# Patient Record
Sex: MALE | Race: WHITE | NOT HISPANIC OR LATINO | URBAN - METROPOLITAN AREA
[De-identification: names, ages, dates, MRNs, and addresses within clinical notes are randomized per-mention and may not be internally consistent; named-entity substitution may affect disease eponyms.]

---

## 2017-01-31 PROBLEM — Z00.00 ENCOUNTER FOR PREVENTIVE HEALTH EXAMINATION: Status: ACTIVE | Noted: 2017-01-31

## 2017-02-01 ENCOUNTER — OUTPATIENT (OUTPATIENT)
Dept: OUTPATIENT SERVICES | Facility: HOSPITAL | Age: 57
LOS: 1 days | End: 2017-02-01
Payer: COMMERCIAL

## 2017-02-01 ENCOUNTER — APPOINTMENT (OUTPATIENT)
Dept: MRI IMAGING | Facility: CLINIC | Age: 57
End: 2017-02-01

## 2017-02-01 DIAGNOSIS — Z00.8 ENCOUNTER FOR OTHER GENERAL EXAMINATION: ICD-10-CM

## 2017-02-01 PROCEDURE — 73721 MRI JNT OF LWR EXTRE W/O DYE: CPT

## 2017-05-03 ENCOUNTER — OUTPATIENT (OUTPATIENT)
Dept: OUTPATIENT SERVICES | Facility: HOSPITAL | Age: 57
LOS: 1 days | End: 2017-05-03
Payer: COMMERCIAL

## 2017-05-03 ENCOUNTER — APPOINTMENT (OUTPATIENT)
Dept: MRI IMAGING | Facility: CLINIC | Age: 57
End: 2017-05-03

## 2017-05-03 DIAGNOSIS — Z00.8 ENCOUNTER FOR OTHER GENERAL EXAMINATION: ICD-10-CM

## 2017-05-03 PROCEDURE — 72148 MRI LUMBAR SPINE W/O DYE: CPT

## 2017-05-12 ENCOUNTER — APPOINTMENT (OUTPATIENT)
Dept: ORTHOPEDIC SURGERY | Facility: CLINIC | Age: 57
End: 2017-05-12

## 2017-05-12 VITALS — WEIGHT: 180 LBS | HEIGHT: 75 IN | BODY MASS INDEX: 22.38 KG/M2

## 2017-05-12 VITALS — BODY MASS INDEX: 22.38 KG/M2 | WEIGHT: 180 LBS | HEIGHT: 75 IN

## 2017-05-12 VITALS
BODY MASS INDEX: 22.38 KG/M2 | WEIGHT: 180 LBS | DIASTOLIC BLOOD PRESSURE: 57 MMHG | SYSTOLIC BLOOD PRESSURE: 157 MMHG | HEIGHT: 75 IN | HEART RATE: 81 BPM

## 2017-05-12 RX ORDER — MULTIVITAMIN
TABLET ORAL
Refills: 0 | Status: ACTIVE | COMMUNITY

## 2017-06-15 ENCOUNTER — OUTPATIENT (OUTPATIENT)
Dept: OUTPATIENT SERVICES | Facility: HOSPITAL | Age: 57
LOS: 1 days | End: 2017-06-15
Payer: COMMERCIAL

## 2017-06-15 ENCOUNTER — APPOINTMENT (OUTPATIENT)
Dept: MRI IMAGING | Facility: CLINIC | Age: 57
End: 2017-06-15

## 2017-06-15 DIAGNOSIS — Z00.8 ENCOUNTER FOR OTHER GENERAL EXAMINATION: ICD-10-CM

## 2017-06-15 PROCEDURE — 72148 MRI LUMBAR SPINE W/O DYE: CPT

## 2017-06-26 ENCOUNTER — APPOINTMENT (OUTPATIENT)
Dept: ORTHOPEDIC SURGERY | Facility: CLINIC | Age: 57
End: 2017-06-26

## 2017-06-26 DIAGNOSIS — M54.5 LOW BACK PAIN: ICD-10-CM

## 2017-07-10 ENCOUNTER — APPOINTMENT (OUTPATIENT)
Dept: INTERVENTIONAL RADIOLOGY/VASCULAR | Facility: CLINIC | Age: 57
End: 2017-07-10

## 2017-07-10 VITALS
SYSTOLIC BLOOD PRESSURE: 122 MMHG | BODY MASS INDEX: 22.38 KG/M2 | TEMPERATURE: 98.2 F | HEIGHT: 75 IN | OXYGEN SATURATION: 100 % | HEART RATE: 70 BPM | WEIGHT: 180 LBS | DIASTOLIC BLOOD PRESSURE: 80 MMHG

## 2017-07-10 DIAGNOSIS — Z78.9 OTHER SPECIFIED HEALTH STATUS: ICD-10-CM

## 2017-07-10 DIAGNOSIS — R93.7 ABNORMAL FINDINGS ON DIAGNOSTIC IMAGING OF OTHER PARTS OF MUSCULOSKELETAL SYSTEM: ICD-10-CM

## 2017-07-10 DIAGNOSIS — S32.130D: ICD-10-CM

## 2017-07-10 DIAGNOSIS — M54.42 LUMBAGO WITH SCIATICA, LEFT SIDE: ICD-10-CM

## 2017-07-10 DIAGNOSIS — H35.351 CYSTOID MACULAR DEGENERATION, RIGHT EYE: ICD-10-CM

## 2017-07-10 RX ORDER — IBUPROFEN 200 MG/1
TABLET, COATED ORAL
Refills: 0 | Status: ACTIVE | COMMUNITY

## 2017-07-31 ENCOUNTER — OUTPATIENT (OUTPATIENT)
Dept: OUTPATIENT SERVICES | Facility: HOSPITAL | Age: 57
LOS: 1 days | End: 2017-07-31
Payer: COMMERCIAL

## 2017-07-31 ENCOUNTER — APPOINTMENT (OUTPATIENT)
Dept: MRI IMAGING | Facility: CLINIC | Age: 57
End: 2017-07-31
Payer: COMMERCIAL

## 2017-07-31 DIAGNOSIS — M25.511 PAIN IN RIGHT SHOULDER: ICD-10-CM

## 2017-07-31 DIAGNOSIS — Z00.8 ENCOUNTER FOR OTHER GENERAL EXAMINATION: ICD-10-CM

## 2017-07-31 DIAGNOSIS — G89.29 OTHER CHRONIC PAIN: ICD-10-CM

## 2017-07-31 PROCEDURE — 73221 MRI JOINT UPR EXTREM W/O DYE: CPT

## 2017-07-31 PROCEDURE — 73221 MRI JOINT UPR EXTREM W/O DYE: CPT | Mod: 26,RT

## 2017-08-24 ENCOUNTER — APPOINTMENT (OUTPATIENT)
Dept: ORTHOPEDIC SURGERY | Facility: CLINIC | Age: 57
End: 2017-08-24
Payer: COMMERCIAL

## 2017-08-24 VITALS — BODY MASS INDEX: 22.38 KG/M2 | WEIGHT: 180 LBS | HEIGHT: 75 IN

## 2017-08-24 PROCEDURE — 99214 OFFICE O/P EST MOD 30 MIN: CPT

## 2017-08-24 PROCEDURE — 73030 X-RAY EXAM OF SHOULDER: CPT | Mod: RT

## 2017-11-18 ENCOUNTER — OUTPATIENT (OUTPATIENT)
Dept: OUTPATIENT SERVICES | Facility: HOSPITAL | Age: 57
LOS: 1 days | End: 2017-11-18
Payer: COMMERCIAL

## 2017-11-18 ENCOUNTER — APPOINTMENT (OUTPATIENT)
Dept: MRI IMAGING | Facility: CLINIC | Age: 57
End: 2017-11-18
Payer: COMMERCIAL

## 2017-11-18 DIAGNOSIS — Z00.8 ENCOUNTER FOR OTHER GENERAL EXAMINATION: ICD-10-CM

## 2017-11-18 PROCEDURE — 72148 MRI LUMBAR SPINE W/O DYE: CPT | Mod: 26

## 2017-11-18 PROCEDURE — 72148 MRI LUMBAR SPINE W/O DYE: CPT

## 2018-01-24 ENCOUNTER — APPOINTMENT (OUTPATIENT)
Dept: ORTHOPEDIC SURGERY | Facility: CLINIC | Age: 58
End: 2018-01-24
Payer: COMMERCIAL

## 2018-01-24 VITALS — WEIGHT: 180 LBS | HEIGHT: 75 IN | BODY MASS INDEX: 22.38 KG/M2

## 2018-01-24 PROCEDURE — 99215 OFFICE O/P EST HI 40 MIN: CPT | Mod: 25

## 2018-01-24 PROCEDURE — 20610 DRAIN/INJ JOINT/BURSA W/O US: CPT | Mod: RT

## 2018-07-27 PROBLEM — Z78.9 ALCOHOL USE: Status: ACTIVE | Noted: 2017-05-12

## 2018-09-20 ENCOUNTER — APPOINTMENT (OUTPATIENT)
Dept: ORTHOPEDIC SURGERY | Facility: CLINIC | Age: 58
End: 2018-09-20
Payer: COMMERCIAL

## 2018-09-20 VITALS — BODY MASS INDEX: 22.38 KG/M2 | WEIGHT: 180 LBS | HEIGHT: 75 IN

## 2018-09-20 DIAGNOSIS — G89.29 SACROCOCCYGEAL DISORDERS, NOT ELSEWHERE CLASSIFIED: ICD-10-CM

## 2018-09-20 DIAGNOSIS — M53.3 SACROCOCCYGEAL DISORDERS, NOT ELSEWHERE CLASSIFIED: ICD-10-CM

## 2018-09-20 PROCEDURE — 99214 OFFICE O/P EST MOD 30 MIN: CPT

## 2018-09-20 PROCEDURE — 73502 X-RAY EXAM HIP UNI 2-3 VIEWS: CPT | Mod: LT

## 2019-11-22 ENCOUNTER — APPOINTMENT (OUTPATIENT)
Dept: ORTHOPEDIC SURGERY | Facility: CLINIC | Age: 59
End: 2019-11-22
Payer: COMMERCIAL

## 2019-11-22 VITALS — WEIGHT: 180 LBS | BODY MASS INDEX: 22.38 KG/M2 | HEIGHT: 75 IN

## 2019-11-22 PROCEDURE — 99214 OFFICE O/P EST MOD 30 MIN: CPT

## 2019-11-27 ENCOUNTER — APPOINTMENT (OUTPATIENT)
Dept: MRI IMAGING | Facility: CLINIC | Age: 59
End: 2019-11-27
Payer: COMMERCIAL

## 2019-11-27 ENCOUNTER — OUTPATIENT (OUTPATIENT)
Dept: OUTPATIENT SERVICES | Facility: HOSPITAL | Age: 59
LOS: 1 days | End: 2019-11-27
Payer: COMMERCIAL

## 2019-11-27 DIAGNOSIS — M25.562 PAIN IN LEFT KNEE: ICD-10-CM

## 2019-11-27 PROCEDURE — 73721 MRI JNT OF LWR EXTRE W/O DYE: CPT | Mod: 26,LT

## 2019-11-27 PROCEDURE — 73721 MRI JNT OF LWR EXTRE W/O DYE: CPT

## 2019-12-16 ENCOUNTER — APPOINTMENT (OUTPATIENT)
Dept: ORTHOPEDIC SURGERY | Facility: CLINIC | Age: 59
End: 2019-12-16
Payer: COMMERCIAL

## 2019-12-16 PROCEDURE — 99214 OFFICE O/P EST MOD 30 MIN: CPT

## 2021-07-01 ENCOUNTER — APPOINTMENT (OUTPATIENT)
Dept: ORTHOPEDIC SURGERY | Facility: CLINIC | Age: 61
End: 2021-07-01
Payer: COMMERCIAL

## 2021-07-01 VITALS — BODY MASS INDEX: 22.38 KG/M2 | WEIGHT: 180 LBS | HEIGHT: 75 IN

## 2021-07-01 DIAGNOSIS — M25.562 PAIN IN LEFT KNEE: ICD-10-CM

## 2021-07-01 DIAGNOSIS — Z87.81 PERSONAL HISTORY OF (HEALED) TRAUMATIC FRACTURE: ICD-10-CM

## 2021-07-01 DIAGNOSIS — M70.62 TROCHANTERIC BURSITIS, LEFT HIP: ICD-10-CM

## 2021-07-01 DIAGNOSIS — M75.81 OTHER SHOULDER LESIONS, RIGHT SHOULDER: ICD-10-CM

## 2021-07-01 PROCEDURE — 73030 X-RAY EXAM OF SHOULDER: CPT | Mod: RT

## 2021-07-01 PROCEDURE — 73562 X-RAY EXAM OF KNEE 3: CPT | Mod: LT

## 2021-07-01 PROCEDURE — 99214 OFFICE O/P EST MOD 30 MIN: CPT

## 2021-07-06 PROBLEM — M75.81 ROTATOR CUFF TENDINITIS, RIGHT: Status: ACTIVE | Noted: 2017-08-24

## 2021-07-06 PROBLEM — Z87.81 S/P LEFT HIP FRACTURE: Status: ACTIVE | Noted: 2018-09-20

## 2021-07-06 PROBLEM — M25.562 ACUTE PAIN OF LEFT KNEE: Status: ACTIVE | Noted: 2019-11-22

## 2021-07-06 PROBLEM — M70.62 GREATER TROCHANTERIC BURSITIS OF LEFT HIP: Status: ACTIVE | Noted: 2018-09-20

## 2021-07-06 NOTE — DISCUSSION/SUMMARY
[de-identified] : Patient has moderate arthritis of the knee mild degenerative meniscal tearing will try and continue to consider treatment NSAIDs and physical therapy. Patient also understands the treatment options including arthroscopy. He tried NSAIDs physical therapy and follow up in 3 months patient understands and agrees with the plan\par \par He also has right shoulder rotator cuff tendinitis we will try conservative treatment of therapy strengthening

## 2021-07-06 NOTE — HISTORY OF PRESENT ILLNESS
[de-identified] : Mr. STEFFEN DENSON  is a 61 year old male who presents with  2 months of left knee pan and right shoulder pain.  HIs left knee hurts anteriorly under the knee cap since increasing his running mileage.  He feels his shoulder is related to his running as well. \par

## 2021-07-06 NOTE — PHYSICAL EXAM
[Antalgic] : antalgic [Normal RUE] : Right Upper Extremity: No scars, rashes, lesions, ulcers, skin intact [Normal Finger/nose] : finger to nose coordination [Normal Touch] : sensation intact for touch [Poor Appearance] : well-appearing [Acute Distress] : not in acute distress [Obese] : not obese [Normal] : No respiratory distress and lungs were clear to auscultation bilaterally [de-identified] : Patient appears stated age in no acute respiratory distress. Patient is alert oriented x3. Patient has normal mood and affect. \par Right knee exam a\par Range her for of motion of the knee is 0-120°. \par Skin is normal.  No rash.\par There is no effusion. No medial or lateral joint line tenderness. No swelling, no pitting edema.\par Overall alignment of the knee is then slight varus. Good anterior posterior stability. Firm endpoints on anterior and posterior drawer. \par Medial lateral stability is intact. Firm endpoint on medial and lateral stress testing .\par Theodora test is negative.\par Quadriceps strength 5/ 5. There is no loss of muscle volume in the thigh. \par Good anterior posterior and mediolateral stability.\par Sensation in the extremities intact. \par Discrimination is intact. Good DP and PT pulses.\par 		\par \par Bilateral hip exam\par On inspection of the hip shows skin is normal. No evidence of rash. \par No loss of muscle.  Abductor strength is 5 out of 5. Hip flexor strength is 5.\par Range of motion of the hip at 90° flexion internal rotation is 15° external rotation is 30° pain-free. \par Hip has good stability in anterior and posterior direction. \par On lateral decubitus  examination there is no tenderness in the greater trochanter. \par Lower Extremity Examination \par Bilateral lower extremity skin is normal. There is no rash. There is no edema and lymphadenopathy.  DP and PT pulses intact. Sensation is intact.\par Left knee exam\par Skin is normal. Range of motion 0-120°. Pain level at the deep end of flexion. Theodora test is positive. There is mild effusion. Good anterior posterior and mediolateral stability. Firm endpoints on anterior and posterior drawer test.\par Quadriceps strength is 5 x 5. There is no atrophy of the muscles. The knee alignment is overall in 5° of varus.\par  [de-identified] : X-rays of the left knee 3 views from the outside facility shows a well-preserved joint space\par \par X-rays of the right shoulder shows well-preserved joint space 3 views

## 2021-09-29 ENCOUNTER — APPOINTMENT (OUTPATIENT)
Dept: OPHTHALMOLOGY | Facility: CLINIC | Age: 61
End: 2021-09-29
Payer: COMMERCIAL

## 2021-09-29 ENCOUNTER — NON-APPOINTMENT (OUTPATIENT)
Age: 61
End: 2021-09-29

## 2021-09-29 PROCEDURE — 92134 CPTRZ OPH DX IMG PST SGM RTA: CPT

## 2021-09-29 PROCEDURE — 99214 OFFICE O/P EST MOD 30 MIN: CPT

## 2021-11-11 ENCOUNTER — APPOINTMENT (OUTPATIENT)
Dept: UROLOGY | Facility: CLINIC | Age: 61
End: 2021-11-11
Payer: COMMERCIAL

## 2021-11-11 VITALS
BODY MASS INDEX: 21.76 KG/M2 | OXYGEN SATURATION: 99 % | HEART RATE: 65 BPM | WEIGHT: 175 LBS | RESPIRATION RATE: 14 BRPM | SYSTOLIC BLOOD PRESSURE: 129 MMHG | HEIGHT: 75 IN | DIASTOLIC BLOOD PRESSURE: 84 MMHG | TEMPERATURE: 98 F

## 2021-11-11 DIAGNOSIS — R97.20 ELEVATED PROSTATE, SPECIFIC ANTIGEN [PSA]: ICD-10-CM

## 2021-11-11 PROCEDURE — 99203 OFFICE O/P NEW LOW 30 MIN: CPT

## 2021-11-11 NOTE — ASSESSMENT
[FreeTextEntry1] : Rising PSA level was noted and discussed with the patient.  He has family history of enlarged prostate but no prostate cancer.  His total PSA level remains completely within the normal range however.  His examination is unremarkable.  Recommend continuing to monitor PSA level on annual basis at this moment.  He is aware that prostate cancer can exist at any PSA level.  He will continue to monitor his levels and return to us in the future if needed.\par \par Anderson Salgado MD, FACS\par [facility]\par  of Urology\par \par [address], [address]\[place] [address]\par \par [address], [address]\[place] [address]\par \par Tel: [phone]\par Fax: [phone]

## 2021-11-11 NOTE — HISTORY OF PRESENT ILLNESS
[FreeTextEntry1] : He is a 61-year-old man who is seen today for initial visit.  He generally does not have significant urinary symptoms.  He does not complain of erectile dysfunction.  He was told to discuss his PSA level.  There is no known family history of prostate cancer.  He has family history of enlarged prostate.  Laboratory values showed PSA of 2.27 in September 2021, 1.8 in March 2021 and 1.2 in February 2020.  Creatinine was 0.9.  Residual urine today was less than 100 cc.

## 2021-11-11 NOTE — LETTER BODY
[Dear  ___] : Dear  [unfilled], [Consult Letter:] : I had the pleasure of evaluating your patient, [unfilled]. [Consult Closing:] : Thank you very much for allowing me to participate in the care of this patient.  If you have any questions, please do not hesitate to contact me. [FreeTextEntry1] : [facility]\par [address] \[place], [place], [address]  \par [phone]

## 2021-11-11 NOTE — PHYSICAL EXAM
[General Appearance - Well Developed] : well developed [General Appearance - Well Nourished] : well nourished [Normal Appearance] : normal appearance [Well Groomed] : well groomed [General Appearance - In No Acute Distress] : no acute distress [Edema] : no peripheral edema [Respiration, Rhythm And Depth] : normal respiratory rhythm and effort [Exaggerated Use Of Accessory Muscles For Inspiration] : no accessory muscle use [Abdomen Soft] : soft [Abdomen Tenderness] : non-tender [Costovertebral Angle Tenderness] : no ~M costovertebral angle tenderness [Urethral Meatus] : meatus normal [Penis Abnormality] : normal circumcised penis [Urinary Bladder Findings] : the bladder was normal on palpation [Scrotum] : the scrotum was normal [Testes Tenderness] : no tenderness of the testes [Testes Mass (___cm)] : there were no testicular masses [Prostate Tenderness] : the prostate was not tender [No Prostate Nodules] : no prostate nodules [FreeTextEntry1] : Prostate mildly enlarged. [Normal Station and Gait] : the gait and station were normal for the patient's age [] : no rash [No Focal Deficits] : no focal deficits [Oriented To Time, Place, And Person] : oriented to person, place, and time [Affect] : the affect was normal [Mood] : the mood was normal [Not Anxious] : not anxious [Inguinal Lymph Nodes Enlarged Bilaterally] : inguinal

## 2022-03-30 ENCOUNTER — NON-APPOINTMENT (OUTPATIENT)
Age: 62
End: 2022-03-30

## 2022-03-30 ENCOUNTER — APPOINTMENT (OUTPATIENT)
Dept: OPHTHALMOLOGY | Facility: CLINIC | Age: 62
End: 2022-03-30
Payer: COMMERCIAL

## 2022-03-30 PROCEDURE — 92014 COMPRE OPH EXAM EST PT 1/>: CPT

## 2022-03-30 PROCEDURE — 92134 CPTRZ OPH DX IMG PST SGM RTA: CPT

## 2022-05-21 ENCOUNTER — APPOINTMENT (OUTPATIENT)
Dept: MRI IMAGING | Facility: CLINIC | Age: 62
End: 2022-05-21
Payer: COMMERCIAL

## 2022-05-21 ENCOUNTER — OUTPATIENT (OUTPATIENT)
Dept: OUTPATIENT SERVICES | Facility: HOSPITAL | Age: 62
LOS: 1 days | End: 2022-05-21
Payer: COMMERCIAL

## 2022-05-21 DIAGNOSIS — Z00.8 ENCOUNTER FOR OTHER GENERAL EXAMINATION: ICD-10-CM

## 2022-05-21 PROCEDURE — 73721 MRI JNT OF LWR EXTRE W/O DYE: CPT

## 2022-05-21 PROCEDURE — 73721 MRI JNT OF LWR EXTRE W/O DYE: CPT | Mod: 26,LT

## 2022-05-25 ENCOUNTER — APPOINTMENT (OUTPATIENT)
Dept: ORTHOPEDIC SURGERY | Facility: CLINIC | Age: 62
End: 2022-05-25
Payer: COMMERCIAL

## 2022-05-25 DIAGNOSIS — M23.92 UNSPECIFIED INTERNAL DERANGEMENT OF LEFT KNEE: ICD-10-CM

## 2022-05-25 PROCEDURE — 99204 OFFICE O/P NEW MOD 45 MIN: CPT

## 2022-05-25 PROCEDURE — 99214 OFFICE O/P EST MOD 30 MIN: CPT

## 2022-05-25 PROCEDURE — 73564 X-RAY EXAM KNEE 4 OR MORE: CPT | Mod: LT

## 2022-05-25 NOTE — PHYSICAL EXAM
[de-identified] : Constitutional\par o Appearance : well-nourished, well developed, alert, in no acute distress \par Head and Face\par o Head :\par ¦ Inspection : atraumatic, normocephalic\par o Face :\par ¦ Inspection : no visible rash or discoloration\par Respiratory\par o Respiratory Effort: breathing unlabored \par Neurologic\par o Mental Status Examination :\par ¦ Orientation : alert and oriented X 3\par Psychiatric\par o Mood and Affect: mood normal, affect appropriate\par Cardiovascular\par o Observation/Palpation : - no swelling\par Lymphatic\par o Additional Nodes : No palpable lymph nodes present\par \par Right Lower Extremity\par o Buttock : no tenderness, swelling or deformities \par o Right Hip :\par ¦ Inspection/Palpation : no tenderness, swelling or deformities\par ¦ Range of Motion : full and painless in all planes, no crepitance\par ¦ Stability : joint stability intact\par ¦ Strength : extension, flexion, adduction, abduction, internal rotation and external rotation, 5/5\par \par o Right Knee :\par ¦ Inspection/Palpation : no tenderness to palpation, no swelling\par ¦ Range of Motion : active flexion and extension full and painless, no crepitance\par ¦ Stability : no valgus or varus instability present on provocative testing\par ¦ Strength : flexion and extension 5/5\par ¦ Tests and Signs : negative Anterior Drawer, negative Lachman, negative Theodora\par \par Left Lower Extremity\par o Buttock : no tenderness, swelling or deformities \par o Left Hip :\par ¦ Inspection/Palpation : no tenderness, no swelling or deformities\par ¦ Range of Motion : full and painless in all planes, no crepitance\par ¦ Stability : joint stability intact\par ¦ Strength : extension, flexion, adduction, abduction, internal rotation and external rotation, 5/5\par \par o Left Knee :\par ¦ Inspection/Palpation : severe posteromedial joint line tenderness to palpation, mild swelling\par ¦ Range of Motion : lacks full extension and has a partially locked knee, pain with flexion past 120°, decreased patellofemoral glide \par ¦ Stability : no valgus or varus instability present on provocative testing\par ¦ Strength : flexion and extension 5/5\par ¦ Tests and Signs : negative Anterior Drawer, negative Lachman, mildly positive Theodora\par \par Gait and Station:\par Gait: lacks full extension on the left, antalgic gait on a bent knee  on the left, no significant extremity swelling or lymphedema, good proprioception and balance\par \par Radiology Results\par o Left Knee : Standing AP, lateral, tunnel, and skyline views of the knee were obtained and reveal moderate patellofemoral arthritis.

## 2022-05-25 NOTE — HISTORY OF PRESENT ILLNESS
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 He denies an injury. He is a runner. He runs 30-40 miles a week and can run without any pain. He describes a 6-9/10 pain on the medial and posterior aspects that is intermittent in nature and exacerbated with ROM. He denies buckling. His knee swelled up about 3 weeks ago. For years he has had chronic discomfort on the lateral aspect and difficulty with deep lunges, but his current pain is new. His goal is to run in the [place] Marathon.\par \par Left Knee MRI obtained at [facility] on 05/21/2022 reveals:\par 1. Patellofemoral and lateral compartment arthrosis as described above.\par 2. Partial tearing at the free edge of the body through posterior root of the medial meniscus. Mild medial meniscal extrusion.\par 3. Small knee joint effusion. Small popliteal cyst with leakage.

## 2022-05-25 NOTE — ADDENDUM
[FreeTextEntry1] : I, Alex Jalloh, acted solely as a scribe for Dr. Jordy Sanchez on this date 05/25/2022.\par All medical record entries made by the Scribe were at my, Dr. Jordy Sanchez, direction and personally dictated by me on 05/25/2022. I have reviewed the chart and agree that the record accurately reflects my personal performance of the history, physical exam, assessment and plan. I have also personally directed, reviewed, and agreed with the chart.

## 2022-05-25 NOTE — DISCUSSION/SUMMARY
[de-identified] : I discussed the underlying pathophysiology of the patient's condition in great detail with the patient and his partner I went over the patient's x-rays and MRI with them in great detail. The extent of the patient’s arthritis was discussed in great detail with them. Various treatments including cortisone injections, viscosupplementation, and surgical intervention were discussed as solutions for the patient's symptoms. However, his insurance company does not cover gel injections. Therefore, if he would like this treatment he would need to pay himself. He needs to avoid high-impact activities such as running and jumping. I advised him to cut back on the running and to cross train. I recommend alternative activities such as riding a stationary bike on low tension or the elliptical. \par He wishes to proceed with a right knee arthroscopy. We had a lengthy discussion about the patient's issues, and talked about the benefits and risks of the procedure. The patient understands the most common risks include infection, allergy to the anesthetic and deep vein thrombosis. The risks are accepted. The patient was given no assurances that all the symptoms will be alleviated. He should begin to obtain medical clearance including a COVID-19 test. All of their questions were answered. They understand and consent to the plan. \par \par FU 1 week after a left knee arthroscopy.

## 2022-06-03 ENCOUNTER — OUTPATIENT (OUTPATIENT)
Dept: OUTPATIENT SERVICES | Facility: HOSPITAL | Age: 62
LOS: 1 days | End: 2022-06-03
Payer: COMMERCIAL

## 2022-06-03 VITALS
DIASTOLIC BLOOD PRESSURE: 81 MMHG | SYSTOLIC BLOOD PRESSURE: 144 MMHG | RESPIRATION RATE: 18 BRPM | WEIGHT: 175.05 LBS | HEIGHT: 75 IN | TEMPERATURE: 98 F | HEART RATE: 79 BPM | OXYGEN SATURATION: 99 %

## 2022-06-03 DIAGNOSIS — S83.242D OTHER TEAR OF MEDIAL MENISCUS, CURRENT INJURY, LEFT KNEE, SUBSEQUENT ENCOUNTER: ICD-10-CM

## 2022-06-03 DIAGNOSIS — Z98.890 OTHER SPECIFIED POSTPROCEDURAL STATES: Chronic | ICD-10-CM

## 2022-06-03 DIAGNOSIS — Z01.818 ENCOUNTER FOR OTHER PREPROCEDURAL EXAMINATION: ICD-10-CM

## 2022-06-03 LAB
ALBUMIN SERPL ELPH-MCNC: 4.4 G/DL — SIGNIFICANT CHANGE UP (ref 3.3–5)
ALP SERPL-CCNC: 55 U/L — SIGNIFICANT CHANGE UP (ref 30–120)
ALT FLD-CCNC: 27 U/L DA — SIGNIFICANT CHANGE UP (ref 10–60)
ANION GAP SERPL CALC-SCNC: 8 MMOL/L — SIGNIFICANT CHANGE UP (ref 5–17)
AST SERPL-CCNC: 25 U/L — SIGNIFICANT CHANGE UP (ref 10–40)
BILIRUB SERPL-MCNC: 0.9 MG/DL — SIGNIFICANT CHANGE UP (ref 0.2–1.2)
BUN SERPL-MCNC: 14 MG/DL — SIGNIFICANT CHANGE UP (ref 7–23)
CALCIUM SERPL-MCNC: 9.7 MG/DL — SIGNIFICANT CHANGE UP (ref 8.4–10.5)
CHLORIDE SERPL-SCNC: 104 MMOL/L — SIGNIFICANT CHANGE UP (ref 96–108)
CO2 SERPL-SCNC: 27 MMOL/L — SIGNIFICANT CHANGE UP (ref 22–31)
CREAT SERPL-MCNC: 1.15 MG/DL — SIGNIFICANT CHANGE UP (ref 0.5–1.3)
EGFR: 73 ML/MIN/1.73M2 — SIGNIFICANT CHANGE UP
GLUCOSE SERPL-MCNC: 107 MG/DL — HIGH (ref 70–99)
HCT VFR BLD CALC: 44.1 % — SIGNIFICANT CHANGE UP (ref 39–50)
HGB BLD-MCNC: 14.9 G/DL — SIGNIFICANT CHANGE UP (ref 13–17)
MCHC RBC-ENTMCNC: 30.9 PG — SIGNIFICANT CHANGE UP (ref 27–34)
MCHC RBC-ENTMCNC: 33.8 GM/DL — SIGNIFICANT CHANGE UP (ref 32–36)
MCV RBC AUTO: 91.5 FL — SIGNIFICANT CHANGE UP (ref 80–100)
NRBC # BLD: 0 /100 WBCS — SIGNIFICANT CHANGE UP (ref 0–0)
PLATELET # BLD AUTO: 200 K/UL — SIGNIFICANT CHANGE UP (ref 150–400)
POTASSIUM SERPL-MCNC: 4.5 MMOL/L — SIGNIFICANT CHANGE UP (ref 3.5–5.3)
POTASSIUM SERPL-SCNC: 4.5 MMOL/L — SIGNIFICANT CHANGE UP (ref 3.5–5.3)
PROT SERPL-MCNC: 7.7 G/DL — SIGNIFICANT CHANGE UP (ref 6–8.3)
RBC # BLD: 4.82 M/UL — SIGNIFICANT CHANGE UP (ref 4.2–5.8)
RBC # FLD: 13.3 % — SIGNIFICANT CHANGE UP (ref 10.3–14.5)
SODIUM SERPL-SCNC: 139 MMOL/L — SIGNIFICANT CHANGE UP (ref 135–145)
WBC # BLD: 5.17 K/UL — SIGNIFICANT CHANGE UP (ref 3.8–10.5)
WBC # FLD AUTO: 5.17 K/UL — SIGNIFICANT CHANGE UP (ref 3.8–10.5)

## 2022-06-03 PROCEDURE — 85027 COMPLETE CBC AUTOMATED: CPT

## 2022-06-03 PROCEDURE — G0463: CPT

## 2022-06-03 PROCEDURE — 80053 COMPREHEN METABOLIC PANEL: CPT

## 2022-06-03 PROCEDURE — 93010 ELECTROCARDIOGRAM REPORT: CPT

## 2022-06-03 PROCEDURE — 36415 COLL VENOUS BLD VENIPUNCTURE: CPT

## 2022-06-03 PROCEDURE — 93005 ELECTROCARDIOGRAM TRACING: CPT

## 2022-06-03 NOTE — H&P PST ADULT - NSANTHOSAYNRD_GEN_A_CORE
No. DRU screening performed.  STOP BANG Legend: 0-2 = LOW Risk; 3-4 = INTERMEDIATE Risk; 5-8 = HIGH Risk

## 2022-06-03 NOTE — H&P PST ADULT - CARDIOVASCULAR DETAILS
Rounding of the patient was done and the patient was awake in the bed. The patient constant observer is at bedside and has no concerns of patient safety. Every 15 minute visual checks continued.      Mirela Estrada RN  01/20/22 [id number] positive S1/positive S2

## 2022-06-03 NOTE — H&P PST ADULT - NSICDXFAMILYHX_GEN_ALL_CORE_FT
FAMILY HISTORY:  Father  Still living? Unknown  FH: CHF (congestive heart failure), Age at diagnosis: Age Unknown

## 2022-06-03 NOTE — H&P PST ADULT - HISTORY OF PRESENT ILLNESS
63 y/o male with left knee pain since may 2022. History of minor injuries in past too. After last marathon increased pain to left knee. After MRI was advised surgery

## 2022-06-03 NOTE — H&P PST ADULT - TEMPERATURE IN FAHRENHEIT (DEGREES F)
Alert and oriented to person, place, time/situation. normal mood and affect. no apparent risk to self or others. 98

## 2022-06-03 NOTE — H&P PST ADULT - ASSESSMENT
63 y/o male with left knee pain  Planned surgery.- left knee arthroscopy  Will obtain medical clearance  covid testing 6/12/22  Pre op instructions provided  Instructions provided on medications to continue and to take the day morning of surgery

## 2022-06-08 ENCOUNTER — TRANSCRIPTION ENCOUNTER (OUTPATIENT)
Age: 62
End: 2022-06-08

## 2022-06-12 ENCOUNTER — OUTPATIENT (OUTPATIENT)
Dept: OUTPATIENT SERVICES | Facility: HOSPITAL | Age: 62
LOS: 1 days | End: 2022-06-12
Payer: COMMERCIAL

## 2022-06-12 DIAGNOSIS — Z20.828 CONTACT WITH AND (SUSPECTED) EXPOSURE TO OTHER VIRAL COMMUNICABLE DISEASES: ICD-10-CM

## 2022-06-12 DIAGNOSIS — Z98.890 OTHER SPECIFIED POSTPROCEDURAL STATES: Chronic | ICD-10-CM

## 2022-06-12 LAB — SARS-COV-2 RNA SPEC QL NAA+PROBE: SIGNIFICANT CHANGE UP

## 2022-06-12 PROCEDURE — U0005: CPT

## 2022-06-12 PROCEDURE — U0003: CPT

## 2022-06-13 ENCOUNTER — TRANSCRIPTION ENCOUNTER (OUTPATIENT)
Age: 62
End: 2022-06-13

## 2022-06-14 ENCOUNTER — OUTPATIENT (OUTPATIENT)
Dept: OUTPATIENT SERVICES | Facility: HOSPITAL | Age: 62
LOS: 1 days | End: 2022-06-14
Payer: COMMERCIAL

## 2022-06-14 ENCOUNTER — TRANSCRIPTION ENCOUNTER (OUTPATIENT)
Age: 62
End: 2022-06-14

## 2022-06-14 ENCOUNTER — APPOINTMENT (OUTPATIENT)
Dept: ORTHOPEDIC SURGERY | Facility: HOSPITAL | Age: 62
End: 2022-06-14

## 2022-06-14 VITALS
TEMPERATURE: 98 F | DIASTOLIC BLOOD PRESSURE: 74 MMHG | HEART RATE: 66 BPM | RESPIRATION RATE: 13 BRPM | SYSTOLIC BLOOD PRESSURE: 120 MMHG | WEIGHT: 183.42 LBS | OXYGEN SATURATION: 100 % | HEIGHT: 75 IN

## 2022-06-14 VITALS
OXYGEN SATURATION: 96 % | DIASTOLIC BLOOD PRESSURE: 79 MMHG | RESPIRATION RATE: 16 BRPM | HEART RATE: 69 BPM | SYSTOLIC BLOOD PRESSURE: 127 MMHG

## 2022-06-14 DIAGNOSIS — Z98.890 OTHER SPECIFIED POSTPROCEDURAL STATES: Chronic | ICD-10-CM

## 2022-06-14 DIAGNOSIS — S83.242D OTHER TEAR OF MEDIAL MENISCUS, CURRENT INJURY, LEFT KNEE, SUBSEQUENT ENCOUNTER: ICD-10-CM

## 2022-06-14 PROCEDURE — 97161 PT EVAL LOW COMPLEX 20 MIN: CPT

## 2022-06-14 PROCEDURE — 29880 ARTHRS KNE SRG MNISECTMY M&L: CPT | Mod: LT

## 2022-06-14 RX ORDER — CHLORHEXIDINE GLUCONATE 213 G/1000ML
1 SOLUTION TOPICAL ONCE
Refills: 0 | Status: COMPLETED | OUTPATIENT
Start: 2022-06-14 | End: 2022-06-14

## 2022-06-14 RX ORDER — ONDANSETRON 8 MG/1
4 TABLET, FILM COATED ORAL ONCE
Refills: 0 | Status: DISCONTINUED | OUTPATIENT
Start: 2022-06-14 | End: 2022-06-14

## 2022-06-14 RX ORDER — OXYCODONE HYDROCHLORIDE 5 MG/1
1 TABLET ORAL
Qty: 16 | Refills: 0
Start: 2022-06-14 | End: 2022-06-17

## 2022-06-14 RX ORDER — APREPITANT 80 MG/1
40 CAPSULE ORAL ONCE
Refills: 0 | Status: COMPLETED | OUTPATIENT
Start: 2022-06-14 | End: 2022-06-14

## 2022-06-14 RX ORDER — HYDROMORPHONE HYDROCHLORIDE 2 MG/ML
0.5 INJECTION INTRAMUSCULAR; INTRAVENOUS; SUBCUTANEOUS
Refills: 0 | Status: DISCONTINUED | OUTPATIENT
Start: 2022-06-14 | End: 2022-06-14

## 2022-06-14 RX ORDER — CEFAZOLIN SODIUM 1 G
2000 VIAL (EA) INJECTION ONCE
Refills: 0 | Status: COMPLETED | OUTPATIENT
Start: 2022-06-14 | End: 2022-06-14

## 2022-06-14 RX ORDER — OXYCODONE HYDROCHLORIDE 5 MG/1
5 TABLET ORAL ONCE
Refills: 0 | Status: DISCONTINUED | OUTPATIENT
Start: 2022-06-14 | End: 2022-06-14

## 2022-06-14 RX ORDER — SODIUM CHLORIDE 9 MG/ML
1000 INJECTION, SOLUTION INTRAVENOUS
Refills: 0 | Status: DISCONTINUED | OUTPATIENT
Start: 2022-06-14 | End: 2022-06-14

## 2022-06-14 RX ADMIN — APREPITANT 40 MILLIGRAM(S): 80 CAPSULE ORAL at 07:01

## 2022-06-14 RX ADMIN — HYDROMORPHONE HYDROCHLORIDE 0.5 MILLIGRAM(S): 2 INJECTION INTRAMUSCULAR; INTRAVENOUS; SUBCUTANEOUS at 09:31

## 2022-06-14 RX ADMIN — HYDROMORPHONE HYDROCHLORIDE 0.5 MILLIGRAM(S): 2 INJECTION INTRAMUSCULAR; INTRAVENOUS; SUBCUTANEOUS at 09:43

## 2022-06-14 RX ADMIN — HYDROMORPHONE HYDROCHLORIDE 0.5 MILLIGRAM(S): 2 INJECTION INTRAMUSCULAR; INTRAVENOUS; SUBCUTANEOUS at 09:32

## 2022-06-14 RX ADMIN — CHLORHEXIDINE GLUCONATE 1 APPLICATION(S): 213 SOLUTION TOPICAL at 07:01

## 2022-06-14 RX ADMIN — HYDROMORPHONE HYDROCHLORIDE 0.5 MILLIGRAM(S): 2 INJECTION INTRAMUSCULAR; INTRAVENOUS; SUBCUTANEOUS at 09:19

## 2022-06-14 NOTE — ASU DISCHARGE PLAN (ADULT/PEDIATRIC) - NS MD DC FALL RISK RISK
For information on Fall & Injury Prevention, visit: [email or web address] OR  [email or web address] OR  https://www.cdc.gov/steadi/patient.html

## 2022-06-14 NOTE — PHYSICAL THERAPY INITIAL EVALUATION ADULT - ADDITIONAL COMMENTS
pvt home with 3 MARV w/o HR and 10+5 steps inside w/ HR. Pt does not have any DME, pt drives. Pt's S/O will be available to ast pt as needed upon d/c home

## 2022-06-14 NOTE — ASU DISCHARGE PLAN (ADULT/PEDIATRIC) - CARE PROVIDER_API CALL
Jordy Sanchez)  Orthopaedic Surgery  [address]  [place] [address]  Phone: [phone]  Fax: [phone]  Established Patient  Follow Up Time: 2 weeks

## 2022-06-14 NOTE — BRIEF OPERATIVE NOTE - NSICDXBRIEFPOSTOP_GEN_ALL_CORE_FT
POST-OP DIAGNOSIS:  Acute medial meniscal tear, left, subsequent encounter 14-Jun-2022 07:54:04  Ed Arrieta

## 2022-06-14 NOTE — PHYSICAL THERAPY INITIAL EVALUATION ADULT - CRITERIA FOR SKILLED THERAPEUTIC INTERVENTIONS
Home w/ supervision, cane/anticipated equipment needs at discharge/anticipated discharge recommendation

## 2022-06-14 NOTE — PHYSICAL THERAPY INITIAL EVALUATION ADULT - RANGE OF MOTION EXAMINATION, REHAB EVAL
left knee not tested due to surgery observed ~80 deg sitting in chair/bilateral upper extremity ROM was WNL (within normal limits)/Right LE ROM was WNL (within normal limits)

## 2022-06-14 NOTE — BRIEF OPERATIVE NOTE - NSICDXBRIEFPROCEDURE_GEN_ALL_CORE_FT
PROCEDURES:  Arthroscopy of left knee with partial meniscectomy 14-Jun-2022 07:53:19  Ed Arrieta  
Detail Level: Detailed
Quality 130: Documentation Of Current Medications In The Medical Record: Current Medications Documented

## 2022-06-14 NOTE — BRIEF OPERATIVE NOTE - NSICDXBRIEFPREOP_GEN_ALL_CORE_FT
PRE-OP DIAGNOSIS:  Acute medial meniscal tear, left, subsequent encounter 14-Jun-2022 07:53:59  Ed Arrieta

## 2022-06-15 PROBLEM — M25.562 PAIN IN LEFT KNEE: Chronic | Status: ACTIVE | Noted: 2022-06-03

## 2022-06-23 ENCOUNTER — APPOINTMENT (OUTPATIENT)
Dept: ORTHOPEDIC SURGERY | Facility: CLINIC | Age: 62
End: 2022-06-23
Payer: COMMERCIAL

## 2022-06-23 VITALS — WEIGHT: 315 LBS | HEIGHT: 75 IN | BODY MASS INDEX: 39.17 KG/M2

## 2022-06-23 PROCEDURE — 99024 POSTOP FOLLOW-UP VISIT: CPT

## 2022-07-25 ENCOUNTER — APPOINTMENT (OUTPATIENT)
Dept: ORTHOPEDIC SURGERY | Facility: CLINIC | Age: 62
End: 2022-07-25

## 2022-07-25 DIAGNOSIS — S83.242D OTHER TEAR OF MEDIAL MENISCUS, CURRENT INJURY, LEFT KNEE, SUBSEQUENT ENCOUNTER: ICD-10-CM

## 2022-07-25 PROCEDURE — 99024 POSTOP FOLLOW-UP VISIT: CPT

## 2022-07-25 NOTE — PHYSICAL EXAM
[de-identified] : Constitutional\par o Appearance : well-nourished, well developed, alert, in no acute distress \par Head and Face\par o Head :\par ¦ Inspection : atraumatic, normocephalic\par o Face :\par ¦ Inspection : no visible rash or discoloration\par Respiratory\par o Respiratory Effort: breathing unlabored \par Neurologic\par o Mental Status Examination :\par ¦ Orientation : alert and oriented X 3\par Psychiatric\par o Mood and Affect: mood normal, affect appropriate\par Cardiovascular\par o Observation/Palpation : - no swelling\par Lymphatic\par o Additional Nodes : No palpable lymph nodes present\par \par Right Lower Extremity\par o Buttock : no tenderness, swelling or deformities \par o Right Hip :\par ¦ Inspection/Palpation : no tenderness, swelling or deformities\par ¦ Range of Motion : full and painless in all planes, no crepitance\par ¦ Stability : joint stability intact\par ¦ Strength : extension, flexion, adduction, abduction, internal rotation and external rotation, 5/5\par \par o Right Knee :\par ¦ Inspection/Palpation : no tenderness to palpation, no swelling\par ¦ Range of Motion : active flexion and extension full and painless, no crepitance\par ¦ Stability : no valgus or varus instability present on provocative testing\par ¦ Strength : flexion and extension 5/5\par ¦ Tests and Signs : negative Anterior Drawer, negative Lachman, negative Theodora\par \par Left Lower Extremity\par o Buttock : no tenderness, swelling or deformities \par o Left Hip :\par ¦ Inspection/Palpation : no tenderness, no swelling or deformities\par ¦ Range of Motion : full and painless in all planes, no crepitance\par ¦ Stability : joint stability intact\par ¦ Strength : extension, flexion, adduction, abduction, internal rotation and external rotation, 4/5\par \par o Left Knee :\par ¦ Inspection/Palpation : well-healed arthroscopic portals, medial compartment tenderness, tenderness over the Pes tendons, no patellar tendon tenderness\par ¦ Range of Motion : 0-125° easily, no crepitance\par ¦ Stability : no valgus or varus instability present on provocative testing\par ¦ Strength : flexion 4/5, extension 4-/5, weakness in terminal extension, can SLR\par ¦ Tests and Signs : negative Anterior Drawer, negative Lachman\par \par Gait and Station:\par Gait: heel/toe on the left, no significant extremity swelling or lymphedema, good proprioception and balance

## 2022-07-25 NOTE — ADDENDUM
[FreeTextEntry1] : I, Alex Jalloh, acted solely as a scribe for Dr. Jordy Sanchez on this date 07/25/2022.\par All medical record entries made by the Scribe were at my, Dr. Jordy Sanchez, direction and personally dictated by me on 07/25/2022. I have reviewed the chart and agree that the record accurately reflects my personal performance of the history, physical exam, assessment and plan. I have also personally directed, reviewed, and agreed with the chart.

## 2022-07-25 NOTE — REASON FOR VISIT
[Follow-Up Visit] : a follow-up visit for [Spouse] : spouse [FreeTextEntry2] : s/p arthroscopy left knee, partial medial and lateral meniscectomy, extensive synovectomy, chondroplasty patellofemoral compartment as well as excision of pathological plica and extensive synovectomy; left knee. DOS 6/14/22.

## 2022-07-25 NOTE — DISCUSSION/SUMMARY
[de-identified] : I went over the pathophysiology of the patient's symptoms in great detail with the patient and his wife. I instructed him on tightening exercises and quad sets to do at home. Pool exercises were discussed and demonstrated with the patient. He needs to avoid high-impact activities such as running and jumping and the treadmill. I recommend alternative activities such as running in a pool and riding a stationary bike on low tension. I advised him to bike with the seat as high up as possible and as far back as possible. I am recommending the patient continues going to physical therapy to obtain increases in his strength and mobility. A prescription was provided. I would like to see the patient back in the office in 6 weeks to reassess his condition. If he still has pain under the kneecap and pain with extension we could give him a cortisone shot. All of their questions were answered. They understand and consent to the plan.\par \par FU in 6 weeks.\par after continuing PT for his left knee.

## 2022-07-25 NOTE — HISTORY OF PRESENT ILLNESS
[de-identified] : 62 year old male returns for the 2nd postoperative visit, 6 weeks s/p arthroscopy left knee, partial medial and lateral meniscectomy, extensive synovectomy, chondroplasty patellofemoral compartment as well as excision of pathological plica and extensive synovectomy; left knee. DOS 6/14/22. He is attending PT 2x/week and sees progress overall. He still has medial-sided pain at night when he turns over that wakes him from sleep. He also notes medial-sided pain with every step that started as he increased his walking. He notes difficulty lifting the left leg and cannot do a seated knee extension. He feels like he has runner's knee. He had anterior knee pain when he tried walking on a treadmill.\par \par Radiology Results 5/25/2022:\par o Left Knee : Standing AP, lateral, tunnel, and skyline views of the knee were obtained and reveal moderate patellofemoral arthritis.

## 2022-08-18 ENCOUNTER — APPOINTMENT (OUTPATIENT)
Dept: ORTHOPEDIC SURGERY | Facility: CLINIC | Age: 62
End: 2022-08-18

## 2022-08-18 DIAGNOSIS — M70.41 PREPATELLAR BURSITIS, RIGHT KNEE: ICD-10-CM

## 2022-08-18 PROCEDURE — 20610 DRAIN/INJ JOINT/BURSA W/O US: CPT | Mod: 58,LT

## 2022-08-18 PROCEDURE — 99024 POSTOP FOLLOW-UP VISIT: CPT

## 2022-08-18 RX ORDER — HYALURONATE SODIUM 20 MG/2 ML
20 SYRINGE (ML) INTRAARTICULAR
Qty: 1 | Refills: 0 | Status: ACTIVE | COMMUNITY
Start: 2022-08-18 | End: 1900-01-01

## 2022-08-18 NOTE — REASON FOR VISIT
[Post Operative Visit] : a post operative visit for [Other: _____] : [unfilled] [FreeTextEntry2] : s/p arthroscopy left knee, partial medial and lateral meniscectomy, extensive synovectomy, chondroplasty patellofemoral compartment as well as excision of pathological plica and extensive synovectomy; left knee. DOS 6/14/22.

## 2022-08-18 NOTE — ADDENDUM
[FreeTextEntry1] : I, Alex Jalloh, acted solely as a scribe for Dr. Jordy Sanchez on this date 08/18/2022.\par All medical record entries made by the Scribe were at my, Dr. Jordy Sanchez, direction and personally dictated by me on 08/18/2022. I have reviewed the chart and agree that the record accurately reflects my personal performance of the history, physical exam, assessment and plan. I have also personally directed, reviewed, and agreed with the chart.

## 2022-08-18 NOTE — DISCUSSION/SUMMARY
[de-identified] : I went over the pathophysiology of the patient's symptoms in great detail with the patient and his partner. At-home strengthening and stretching exercises were discussed and demonstrated with the patient. I do not recommend he does high-impact activities such as running and jumping. I recommend alternative activities such as biking, swimming, and light weight high repetition exercises. I am prescribing an  brace for him to wear when he runs. Viscosupplementation was discussed as a solution to the patient's symptoms and we are requesting authorization for Euflexxa for the left knee.\par The patient elected to receive a cortisone injection into his left knee today and tolerated it well. I instructed the patient on ROM exercises and told them to take it easy. The use of ice and rest was reviewed with the patient. The patient may resume activities tomorrow. All of their questions were answered. They understand and consent to the plan.\par \par FU in 1 month.\par after a left knee cortisone injection today (08/18/2022).\par after viscosupplementation authorization.\par after getting an  brace.

## 2022-08-18 NOTE — PHYSICAL EXAM
[de-identified] : Constitutional\par o Appearance : well-nourished, well developed, alert, in no acute distress \par Head and Face\par o Head :\par ¦ Inspection : atraumatic, normocephalic\par o Face :\par ¦ Inspection : no visible rash or discoloration\par Respiratory\par o Respiratory Effort: breathing unlabored \par Neurologic\par o Mental Status Examination :\par ¦ Orientation : alert and oriented X 3\par Psychiatric\par o Mood and Affect: mood normal, affect appropriate\par Cardiovascular\par o Observation/Palpation : - no swelling\par Lymphatic\par o Additional Nodes : No palpable lymph nodes present\par \par Right Lower Extremity\par o Buttock : no tenderness, swelling or deformities \par o Right Hip :\par ¦ Inspection/Palpation : no tenderness, swelling or deformities\par ¦ Range of Motion : full and painless in all planes, no crepitance\par ¦ Stability : joint stability intact\par ¦ Strength : extension, flexion, adduction, abduction, internal rotation and external rotation, 5/5\par \par o Right Knee :\par ¦ Inspection/Palpation : no medial or lateral compartment tenderness to palpation, no swelling\par ¦ Range of Motion : 0-130° painless, no crepitance, decreased patellofemoral glide \par ¦ Stability : no valgus or varus instability present on provocative testing\par ¦ Strength : flexion and extension 5/5\par ¦ Tests and Signs : negative Anterior Drawer, negative Lachman, negative Theodora\par \par Left Lower Extremity\par o Buttock : no tenderness, swelling or deformities \par o Left Hip :\par ¦ Inspection/Palpation : no tenderness, no swelling or deformities\par ¦ Range of Motion : full and painless in all planes, no crepitance\par ¦ Stability : joint stability intact\par ¦ Strength : extension, flexion, adduction, abduction, internal rotation and external rotation, 4+/5\par \par o Left Knee :\par ¦ Inspection/Palpation :  minimal medial compartment tenderness, no lateral compartment tenderness, well-healed arthroscopic portals, minimal puffiness\par ¦ Range of Motion : 0-130° with pain past that, no crepitance\par ¦ Stability : no valgus or varus instability present on provocative testing\par ¦ Strength : flexion and extension 4+/5, can SLR\par ¦ Tests and Signs : negative Anterior Drawer, negative Lachman\par \par Gait and Station:\par Gait: heel/toe on the left, no significant extremity swelling or lymphedema, good proprioception and balance\par \par o Left Knee injection : Indication- knee osteoarthritis, Anatomic location- left intra-articular joint space, Spray - area was sterilized with Betadine and alcohol and anesthetized with Ethyl Chloride , needle used-20G, Medications given- 5cc's lidocaine, 0.5cc's kenalog, 0.5 cc's dexamethasone, and patient tolerated it well.

## 2022-08-18 NOTE — HISTORY OF PRESENT ILLNESS
[de-identified] : 62 year old male returns for a postoperative visit, 8 weeks s/p arthroscopy left knee, partial medial and lateral meniscectomy, extensive synovectomy, chondroplasty patellofemoral compartment as well as excision of pathological plica and extensive synovectomy; left knee. DOS 6/14/22. He is attending PT 2x/week and sees progress overall. He thinks the strength is improved. He still cannot do a leg raise. He is still complaining of medial-sided knee pain. He notes anterior knee pain and difficulty going up steps the day after he goes on a long run. He notes crepitus and noise of his right knee on steps. He is planning on running in [facility] on March 3rd, 2023.\par \par Radiology Results 5/25/2022:\par o Left Knee : Standing AP, lateral, tunnel, and skyline views of the knee were obtained and reveal moderate patellofemoral arthritis.

## 2022-09-12 ENCOUNTER — APPOINTMENT (OUTPATIENT)
Dept: ORTHOPEDIC SURGERY | Facility: CLINIC | Age: 62
End: 2022-09-12

## 2022-09-12 PROCEDURE — 20610 DRAIN/INJ JOINT/BURSA W/O US: CPT | Mod: 58,LT

## 2022-09-12 NOTE — HISTORY OF PRESENT ILLNESS
[de-identified] : 62 year old male returns 3 months s/p left knee arthroscopy. DOS 6/14/22. He notes anterior and medial knee pain, as well as difficulty going up steps the day after he goes on a long run. He still cannot do a seated leg raise. He notes crepitus and noise of his right knee on steps. He is not currently in PT. He recently did two long bike rides and ride an anti-gravity treadmill and had pain. He iced and took Advil after. He received a cortisone injection on 8/18/22. He presents to start a series of Synvisc injections today (09/12/2022). He denies any allergies to chicken eggs or feathers. \par He is planning on running in [facility] on March 3rd, 2023.\par \par Radiology Results 5/25/2022:\par o Left Knee : Standing AP, lateral, tunnel, and skyline views of the knee were obtained and reveal moderate patellofemoral arthritis.

## 2022-09-12 NOTE — PHYSICAL EXAM
[de-identified] : Constitutional\par o Appearance : well-nourished, well developed, alert, in no acute distress \par Head and Face\par o Head :\par ¦ Inspection : atraumatic, normocephalic\par o Face :\par ¦ Inspection : no visible rash or discoloration\par Respiratory\par o Respiratory Effort: breathing unlabored \par Neurologic\par o Mental Status Examination :\par ¦ Orientation : alert and oriented X 3\par Psychiatric\par o Mood and Affect: mood normal, affect appropriate\par Cardiovascular\par o Observation/Palpation : - no swelling\par Lymphatic\par o Additional Nodes : No palpable lymph nodes present\par \par Right Lower Extremity\par o Buttock : no tenderness, swelling or deformities \par o Right Hip :\par ¦ Inspection/Palpation : no tenderness, swelling or deformities\par ¦ Range of Motion : full and painless in all planes, no crepitance\par ¦ Stability : joint stability intact\par ¦ Strength : extension, flexion, adduction, abduction, internal rotation and external rotation, 5/5\par \par o Right Knee :\par ¦ Inspection/Palpation : no medial or lateral compartment tenderness to palpation, no swelling\par ¦ Range of Motion : 0-130° painless, no crepitance, decreased patellofemoral glide \par ¦ Stability : no valgus or varus instability present on provocative testing\par ¦ Strength : flexion and extension 5/5\par ¦ Tests and Signs : negative Anterior Drawer, negative Lachman, negative Theodora\par \par Left Lower Extremity\par o Buttock : no tenderness, swelling or deformities \par o Left Hip :\par ¦ Inspection/Palpation : no tenderness, no swelling or deformities\par ¦ Range of Motion : full and painless in all planes, no crepitance\par ¦ Stability : joint stability intact\par ¦ Strength : extension, flexion, adduction, abduction, internal rotation and external rotation, 4+/5\par \par o Left Knee :\par ¦ Inspection/Palpation :  minimal medial compartment tenderness, no lateral compartment tenderness, well-healed arthroscopic portals, minimal puffiness\par ¦ Range of Motion : 0-130° with pain past that, no crepitance\par ¦ Stability : no valgus or varus instability present on provocative testing\par ¦ Strength : flexion and extension 4+/5, can SLR\par ¦ Tests and Signs : negative Anterior Drawer, negative Lachman\par \par Gait and Station:\par Gait: heel/toe on the left, no significant extremity swelling or lymphedema, good proprioception and balance\par \par o Left Knee injection : Indication- knee osteoarthritis, Anatomic location- left intra-articular joint space, Spray - area was sterilized with Betadine and alcohol and anesthetized with Ethyl Chloride , needle used-20G, Medications given- 2cc's Synvisc (1/3). The patient tolerated the procedure well.  ot# [id number]    oExp: 2025-03-31

## 2022-09-12 NOTE — REASON FOR VISIT
[Follow-Up Visit] : a follow-up visit for [Other: _____] : [unfilled] [FreeTextEntry2] : s/p arthroscopy left knee, partial medial and lateral meniscectomy, extensive synovectomy, chondroplasty patellofemoral compartment as well as excision of pathological plica and extensive synovectomy; left knee. DOS 6/14/22.

## 2022-09-12 NOTE — ADDENDUM
[FreeTextEntry1] : I, Alex Jalloh, acted solely as a scribe for Dr. Jordy Sanchez on this date 09/12/2022.\par All medical record entries made by the Scribe were at my, Dr. Jordy Sanchez, direction and personally dictated by me on 09/12/2022. I have reviewed the chart and agree that the record accurately reflects my personal performance of the history, physical exam, assessment and plan. I have also personally directed, reviewed, and agreed with the chart.

## 2022-09-12 NOTE — DISCUSSION/SUMMARY
[de-identified] : I went over the pathophysiology of the patient's symptoms in great detail with the patient. The patient elected to receive a Synvisc injection into his left knee today, and tolerated it well. I instructed the pt on ROM exercises, and told them to take it easy. The use of ice and rest was reviewed with the patient. The patient may resume activities tomorrow. I reminded the patient that it takes 4 to 6 weeks after the final injection to feel symptom relief. I warned the patient about the risk of an allergic reaction to this injection. He was instructed to call the office if his knee becomes swollen, tight and warm. All of their questions were answered. They understand and consent to the plan.\par \par FU next week for left knee Synvisc injection #2/3.

## 2022-09-19 ENCOUNTER — APPOINTMENT (OUTPATIENT)
Dept: ORTHOPEDIC SURGERY | Facility: CLINIC | Age: 62
End: 2022-09-19

## 2022-09-19 PROCEDURE — 20610 DRAIN/INJ JOINT/BURSA W/O US: CPT | Mod: LT

## 2022-09-19 NOTE — HISTORY OF PRESENT ILLNESS
[de-identified] : 62 year old male returns 3 months s/p left knee arthroscopy. DOS 6/14/22. He notes anterior and medial knee pain, as well as difficulty going up steps the day after he goes on a long run. He still cannot do a seated leg raise. He notes crepitus and noise of his right knee on steps. He is not currently in PT. He recently did two long bike rides and ride an anti-gravity treadmill and had pain. He iced and took Advil after. He received a cortisone injection on 8/18/22. He presents for Synvisc injection #2 of 3 in the left knee today. Denies an adverse reaction to the first injection. Denies any allergies to chicken eggs or feathers. \par He is planning on running in [facility] on March 3rd, 2023.\par \par Radiology Results 5/25/2022:\par o Left Knee : Standing AP, lateral, tunnel, and skyline views of the knee were obtained and reveal moderate patellofemoral arthritis.

## 2022-09-19 NOTE — PHYSICAL EXAM
[de-identified] : Constitutional\par o Appearance : well-nourished, well developed, alert, in no acute distress \par Head and Face\par o Head :\par ¦ Inspection : atraumatic, normocephalic\par o Face :\par ¦ Inspection : no visible rash or discoloration\par Respiratory\par o Respiratory Effort: breathing unlabored \par Neurologic\par o Mental Status Examination :\par ¦ Orientation : alert and oriented X 3\par Psychiatric\par o Mood and Affect: mood normal, affect appropriate\par Cardiovascular\par o Observation/Palpation : - no swelling\par Lymphatic\par o Additional Nodes : No palpable lymph nodes present\par \par Right Lower Extremity\par o Buttock : no tenderness, swelling or deformities \par o Right Hip :\par ¦ Inspection/Palpation : no tenderness, swelling or deformities\par ¦ Range of Motion : full and painless in all planes, no crepitance\par ¦ Stability : joint stability intact\par ¦ Strength : extension, flexion, adduction, abduction, internal rotation and external rotation, 5/5\par \par o Right Knee :\par ¦ Inspection/Palpation : no medial or lateral compartment tenderness to palpation, no swelling\par ¦ Range of Motion : 0-130° painless, no crepitance, decreased patellofemoral glide \par ¦ Stability : no valgus or varus instability present on provocative testing\par ¦ Strength : flexion and extension 5/5\par ¦ Tests and Signs : negative Anterior Drawer, negative Lachman, negative Theodora\par \par Left Lower Extremity\par o Buttock : no tenderness, swelling or deformities \par o Left Hip :\par ¦ Inspection/Palpation : no tenderness, no swelling or deformities\par ¦ Range of Motion : full and painless in all planes, no crepitance\par ¦ Stability : joint stability intact\par ¦ Strength : extension, flexion, adduction, abduction, internal rotation and external rotation, 4+/5\par \par o Left Knee :\par ¦ Inspection/Palpation :  minimal medial compartment tenderness, no lateral compartment tenderness, well-healed arthroscopic portals, minimal puffiness\par ¦ Range of Motion : 0-130° with pain past that, no crepitance\par ¦ Stability : no valgus or varus instability present on provocative testing\par ¦ Strength : flexion and extension 4+/5, can SLR\par ¦ Tests and Signs : negative Anterior Drawer, negative Lachman\par \par Gait and Station:\par Gait: heel/toe on the left, no significant extremity swelling or lymphedema, good proprioception and balance\par \par o Left Knee injection : Indication- knee osteoarthritis, Anatomic location- left intra-articular joint space, Spray - area was sterilized with Betadine and alcohol and anesthetized with Ethyl Chloride , needle used-20G, Medications given- 2cc's Synvisc (2/3). The patient tolerated the procedure well.  ot# [id number]    oExp: 2025-03-31

## 2022-09-19 NOTE — ADDENDUM
[FreeTextEntry1] : I, Alex Jalloh, acted solely as a scribe for Dr. Jordy Sanchez on this date 09/19/2022.\par All medical record entries made by the Scribe were at my, Dr. Jordy Sanchez, direction and personally dictated by me on 09/19/2022. I have reviewed the chart and agree that the record accurately reflects my personal performance of the history, physical exam, assessment and plan. I have also personally directed, reviewed, and agreed with the chart.

## 2022-09-19 NOTE — DISCUSSION/SUMMARY
[de-identified] : I went over the pathophysiology of the patient's symptoms in great detail with the patient. The patient elected to receive a Synvisc injection into his left knee today, and tolerated it well. I instructed the pt on ROM exercises, and told them to take it easy. The use of ice and rest was reviewed with the patient. The patient may resume activities tomorrow. I reminded the patient that it takes 4 to 6 weeks after the final injection to feel symptom relief. I warned the patient about the risk of an allergic reaction to this injection. He was instructed to call the office if his knee becomes swollen, tight and warm. All of their questions were answered. They understand and consent to the plan.\par \par FU next week for left knee Synvisc injection #3/3.

## 2022-09-28 ENCOUNTER — APPOINTMENT (OUTPATIENT)
Dept: ORTHOPEDIC SURGERY | Facility: CLINIC | Age: 62
End: 2022-09-28

## 2022-09-28 PROCEDURE — 20610 DRAIN/INJ JOINT/BURSA W/O US: CPT | Mod: LT

## 2022-09-28 PROCEDURE — 99214 OFFICE O/P EST MOD 30 MIN: CPT | Mod: 25

## 2022-09-28 NOTE — ADDENDUM
[FreeTextEntry1] : I, Alex Jalloh, acted solely as a scribe for Dr. Jordy Sanchez on this date 09/28/2022.\par \par All medical record entries made by the Scribe were at my, Dr. Jordy Sanchez, direction and personally dictated by me on 09/28/2022. I have reviewed the chart and agree that the record accurately reflects my personal performance of the history, physical exam, assessment and plan. I have also personally directed, reviewed, and agreed with the chart.

## 2022-09-28 NOTE — HISTORY OF PRESENT ILLNESS
[de-identified] : 62 year old male returns 3 months s/p left knee arthroscopy. DOS 6/14/22. He notes consistent medial-sided knee pain, as well as difficulty going up steps the day after he goes on a long run. He still cannot do a seated leg raise. He notes crepitus and noise of his right knee on steps. He bikes and rides an anti-gravity treadmill at . He received a cortisone injection on 8/18/22. He presents for Synvisc injection #3 of 3 in the left knee today (09/28/2022). Denies an adverse reaction to the prior injections. Denies any allergies to chicken eggs or feathers. He is also asking about PRP.\par He is planning on running in [facility] on March 3rd, 2023.\par \par Radiology Results 5/25/2022:\par o Left Knee : Standing AP, lateral, tunnel, and skyline views of the knee were obtained and reveal moderate patellofemoral arthritis.

## 2022-09-28 NOTE — REASON FOR VISIT
[Follow-Up Visit] : a follow-up visit for [Other: _____] : [unfilled] [Knee Pain] : knee pain [FreeTextEntry2] : s/p arthroscopy left knee, partial medial and lateral meniscectomy, extensive synovectomy, chondroplasty patellofemoral compartment as well as excision of pathological plica and extensive synovectomy; left knee. DOS 6/14/22.

## 2022-09-28 NOTE — DISCUSSION/SUMMARY
[de-identified] : I went over the pathophysiology of the patient's symptoms in great detail with the patient. We had a long discussion about PRP and stem cell treatments for knee OA. We had a lengthy discussion about the patient's issues and talked about the benefits and risks of these treatments. He understands that this is not FDA approved and no insurance covers this. We have told him that there are no good long-term studies or even good short-term studies at this point but it certainly is a treatment option.\par The patient elected to receive a Synvisc injection into his left knee today, and tolerated it well. I instructed the pt on ROM exercises, and told them to take it easy. The use of ice and rest was reviewed with the patient. The patient may resume activities tomorrow. I reminded the patient that it takes 4 to 6 weeks after this final injection to feel symptom relief. I warned the patient about the risk of an allergic reaction to this injection. He was instructed to call the office if his knee becomes swollen, tight and warm. A PT script was provided stating he can ride an anti-gravity treadmill. I would like to see the patient back in the office in 6 weeks to reassess his condition. All of their questions were answered. They understand and consent to the plan.\par \par FU in 6 weeks.\par after left knee Synvisc injection #3/3 today (09/28/2022).

## 2022-09-28 NOTE — PHYSICAL EXAM
[de-identified] : Constitutional\par o Appearance : well-nourished, well developed, alert, in no acute distress \par Head and Face\par o Head :\par ¦ Inspection : atraumatic, normocephalic\par o Face :\par ¦ Inspection : no visible rash or discoloration\par Respiratory\par o Respiratory Effort: breathing unlabored \par Neurologic\par o Mental Status Examination :\par ¦ Orientation : alert and oriented X 3\par Psychiatric\par o Mood and Affect: mood normal, affect appropriate\par Cardiovascular\par o Observation/Palpation : - no swelling\par Lymphatic\par o Additional Nodes : No palpable lymph nodes present\par \par Right Lower Extremity\par o Buttock : no tenderness, swelling or deformities \par o Right Hip :\par ¦ Inspection/Palpation : no tenderness, swelling or deformities\par ¦ Range of Motion : full and painless in all planes, no crepitance\par ¦ Stability : joint stability intact\par ¦ Strength : extension, flexion, adduction, abduction, internal rotation and external rotation, 5/5\par \par o Right Knee :\par ¦ Inspection/Palpation : no medial or lateral compartment tenderness to palpation, no swelling\par ¦ Range of Motion : 0-130° painless, no crepitance, decreased patellofemoral glide \par ¦ Stability : no valgus or varus instability present on provocative testing\par ¦ Strength : flexion and extension 5/5\par ¦ Tests and Signs : negative Anterior Drawer, negative Lachman, negative Theodora\par \par Left Lower Extremity\par o Buttock : no tenderness, swelling or deformities \par o Left Hip :\par ¦ Inspection/Palpation : no tenderness, no swelling or deformities\par ¦ Range of Motion : full and painless in all planes, no crepitance\par ¦ Stability : joint stability intact\par ¦ Strength : extension, flexion, adduction, abduction, internal rotation and external rotation, 4+/5\par \par o Left Knee :\par ¦ Inspection/Palpation :  minimal medial compartment tenderness, no lateral compartment tenderness, well-healed arthroscopic portals, minimal puffiness\par ¦ Range of Motion : 0-130° with pain past that, no crepitance\par ¦ Stability : no valgus or varus instability present on provocative testing\par ¦ Strength : flexion and extension 4+/5, can SLR\par ¦ Tests and Signs : negative Anterior Drawer, negative Lachman\par \par Gait and Station:\par Gait: heel/toe on the left, no significant extremity swelling or lymphedema, good proprioception and balance\par \par o Left Knee injection : Indication- knee osteoarthritis, Anatomic location- left intra-articular joint space, Spray - area was sterilized with Betadine and alcohol and anesthetized with Ethyl Chloride , needle used-20G, Medications given- 2cc's Synvisc (3/3). The patient tolerated the procedure well.  ot# [id number]    oExp: 2025-03-31

## 2022-10-20 ENCOUNTER — NON-APPOINTMENT (OUTPATIENT)
Age: 62
End: 2022-10-20

## 2022-10-20 ENCOUNTER — APPOINTMENT (OUTPATIENT)
Dept: OPHTHALMOLOGY | Facility: CLINIC | Age: 62
End: 2022-10-20

## 2022-10-20 PROCEDURE — 92014 COMPRE OPH EXAM EST PT 1/>: CPT

## 2022-10-20 PROCEDURE — 92134 CPTRZ OPH DX IMG PST SGM RTA: CPT

## 2022-11-02 ENCOUNTER — APPOINTMENT (OUTPATIENT)
Dept: ORTHOPEDIC SURGERY | Facility: CLINIC | Age: 62
End: 2022-11-02

## 2022-11-02 DIAGNOSIS — M23.91 UNSPECIFIED INTERNAL DERANGEMENT OF RIGHT KNEE: ICD-10-CM

## 2022-11-02 PROCEDURE — 73564 X-RAY EXAM KNEE 4 OR MORE: CPT | Mod: RT

## 2022-11-02 PROCEDURE — 20610 DRAIN/INJ JOINT/BURSA W/O US: CPT | Mod: RT

## 2022-11-02 PROCEDURE — 99214 OFFICE O/P EST MOD 30 MIN: CPT | Mod: 25

## 2022-11-10 RX ORDER — HYALURONATE SODIUM, STABILIZED 88 MG/4 ML
88 SYRINGE (ML) INTRAARTICULAR
Qty: 1 | Refills: 0 | Status: ACTIVE | COMMUNITY
Start: 2022-11-10

## 2022-11-16 ENCOUNTER — OUTPATIENT (OUTPATIENT)
Dept: OUTPATIENT SERVICES | Facility: HOSPITAL | Age: 62
LOS: 1 days | End: 2022-11-16
Payer: COMMERCIAL

## 2022-11-16 ENCOUNTER — APPOINTMENT (OUTPATIENT)
Dept: MRI IMAGING | Facility: CLINIC | Age: 62
End: 2022-11-16

## 2022-11-16 DIAGNOSIS — Z98.890 OTHER SPECIFIED POSTPROCEDURAL STATES: Chronic | ICD-10-CM

## 2022-11-16 DIAGNOSIS — Z00.8 ENCOUNTER FOR OTHER GENERAL EXAMINATION: ICD-10-CM

## 2022-11-16 PROCEDURE — 73721 MRI JNT OF LWR EXTRE W/O DYE: CPT

## 2022-11-16 PROCEDURE — 73721 MRI JNT OF LWR EXTRE W/O DYE: CPT | Mod: 26,RT

## 2022-11-23 ENCOUNTER — APPOINTMENT (OUTPATIENT)
Dept: ORTHOPEDIC SURGERY | Facility: CLINIC | Age: 62
End: 2022-11-23

## 2022-11-23 PROCEDURE — 99214 OFFICE O/P EST MOD 30 MIN: CPT

## 2022-11-28 ENCOUNTER — TRANSCRIPTION ENCOUNTER (OUTPATIENT)
Age: 62
End: 2022-11-28

## 2022-11-28 ENCOUNTER — OUTPATIENT (OUTPATIENT)
Dept: OUTPATIENT SERVICES | Facility: HOSPITAL | Age: 62
LOS: 1 days | End: 2022-11-28
Payer: COMMERCIAL

## 2022-11-28 VITALS
WEIGHT: 182.76 LBS | RESPIRATION RATE: 16 BRPM | DIASTOLIC BLOOD PRESSURE: 88 MMHG | SYSTOLIC BLOOD PRESSURE: 126 MMHG | OXYGEN SATURATION: 99 % | HEIGHT: 74 IN | HEART RATE: 77 BPM | TEMPERATURE: 97 F

## 2022-11-28 DIAGNOSIS — Z98.890 OTHER SPECIFIED POSTPROCEDURAL STATES: Chronic | ICD-10-CM

## 2022-11-28 DIAGNOSIS — S83.241A OTHER TEAR OF MEDIAL MENISCUS, CURRENT INJURY, RIGHT KNEE, INITIAL ENCOUNTER: ICD-10-CM

## 2022-11-28 DIAGNOSIS — S83.241D OTHER TEAR OF MEDIAL MENISCUS, CURRENT INJURY, RIGHT KNEE, SUBSEQUENT ENCOUNTER: ICD-10-CM

## 2022-11-28 DIAGNOSIS — Z01.818 ENCOUNTER FOR OTHER PREPROCEDURAL EXAMINATION: ICD-10-CM

## 2022-11-28 LAB
ANION GAP SERPL CALC-SCNC: 7 MMOL/L — SIGNIFICANT CHANGE UP (ref 5–17)
BUN SERPL-MCNC: 12 MG/DL — SIGNIFICANT CHANGE UP (ref 7–23)
CALCIUM SERPL-MCNC: 9.2 MG/DL — SIGNIFICANT CHANGE UP (ref 8.4–10.5)
CHLORIDE SERPL-SCNC: 105 MMOL/L — SIGNIFICANT CHANGE UP (ref 96–108)
CO2 SERPL-SCNC: 30 MMOL/L — SIGNIFICANT CHANGE UP (ref 22–31)
CREAT SERPL-MCNC: 0.99 MG/DL — SIGNIFICANT CHANGE UP (ref 0.5–1.3)
EGFR: 87 ML/MIN/1.73M2 — SIGNIFICANT CHANGE UP
GLUCOSE SERPL-MCNC: 97 MG/DL — SIGNIFICANT CHANGE UP (ref 70–99)
HCT VFR BLD CALC: 42.3 % — SIGNIFICANT CHANGE UP (ref 39–50)
HGB BLD-MCNC: 14.2 G/DL — SIGNIFICANT CHANGE UP (ref 13–17)
MCHC RBC-ENTMCNC: 30.9 PG — SIGNIFICANT CHANGE UP (ref 27–34)
MCHC RBC-ENTMCNC: 33.6 GM/DL — SIGNIFICANT CHANGE UP (ref 32–36)
MCV RBC AUTO: 92 FL — SIGNIFICANT CHANGE UP (ref 80–100)
NRBC # BLD: 0 /100 WBCS — SIGNIFICANT CHANGE UP (ref 0–0)
PLATELET # BLD AUTO: 212 K/UL — SIGNIFICANT CHANGE UP (ref 150–400)
POTASSIUM SERPL-MCNC: 4.9 MMOL/L — SIGNIFICANT CHANGE UP (ref 3.5–5.3)
POTASSIUM SERPL-SCNC: 4.9 MMOL/L — SIGNIFICANT CHANGE UP (ref 3.5–5.3)
RBC # BLD: 4.6 M/UL — SIGNIFICANT CHANGE UP (ref 4.2–5.8)
RBC # FLD: 13.9 % — SIGNIFICANT CHANGE UP (ref 10.3–14.5)
SARS-COV-2 RNA SPEC QL NAA+PROBE: SIGNIFICANT CHANGE UP
SODIUM SERPL-SCNC: 142 MMOL/L — SIGNIFICANT CHANGE UP (ref 135–145)
WBC # BLD: 5.5 K/UL — SIGNIFICANT CHANGE UP (ref 3.8–10.5)
WBC # FLD AUTO: 5.5 K/UL — SIGNIFICANT CHANGE UP (ref 3.8–10.5)

## 2022-11-28 PROCEDURE — 93010 ELECTROCARDIOGRAM REPORT: CPT

## 2022-11-28 PROCEDURE — 36415 COLL VENOUS BLD VENIPUNCTURE: CPT

## 2022-11-28 PROCEDURE — G0463: CPT

## 2022-11-28 PROCEDURE — U0003: CPT

## 2022-11-28 PROCEDURE — U0005: CPT

## 2022-11-28 PROCEDURE — 80048 BASIC METABOLIC PNL TOTAL CA: CPT

## 2022-11-28 PROCEDURE — 93005 ELECTROCARDIOGRAM TRACING: CPT

## 2022-11-28 PROCEDURE — 85027 COMPLETE CBC AUTOMATED: CPT

## 2022-11-28 RX ORDER — VITAMIN E 100 UNIT
0 CAPSULE ORAL
Qty: 0 | Refills: 0 | DISCHARGE

## 2022-11-28 RX ORDER — OMEGA-3 ACID ETHYL ESTERS 1 G
1 CAPSULE ORAL
Qty: 0 | Refills: 0 | DISCHARGE

## 2022-11-28 NOTE — H&P PST ADULT - PROBLEM SELECTOR PLAN 1
Medical Clearance unless waived by Anesthesia  NPO as per Anesthesia-no meds on AM of surgery  Instructions reviewed and questions addressed  Covid19 screening today.

## 2022-11-28 NOTE — H&P PST ADULT - MUSCULOSKELETAL
details… right knee/no joint erythema/no joint warmth/no calf tenderness/decreased ROM/decreased ROM due to pain/joint swelling/decreased strength

## 2022-11-28 NOTE — H&P PST ADULT - BLOOD AVOIDANCE/RESTRICTIONS, PROFILE
Phoned mother to check on Janett.  Mom stated, \"she is doing well\".  Mom denies any Red Flags (no fever, diarrhea, sweating, increased work of breathing, irritability or inconsolable, oxygen saturation remains >75%, incision is without redness or drainage).  Mom continues to apply hydrogel and Mepilex to the lower edge of her incision daily.  Mom mentioned she still has a rash on her chest, mom thinks it may be an \"allergy\".  We discussed the laundry products she is using (Dreft) and either doing a second rinse or trying a new detergent.   We reviewed her home monitoring data.  HR trend:  137-143; Oxygen saturation trend:  85-90%, mom has been checking the HR and oxygen saturation once a day.  She is tolerating her feedings well, no emesis.  Her feedings are Breast Milk or Similac Advance 24 kcal 65 mL every 3 hours po/ngt.  She had been taking 5 bottles/day with the remainder NGT.  Mom stated, \"she is holding her oral feedings to 10-15 mL as 20 mL was too much, she would get too tired or start making noises\".  Reinforced that was great that mom recognized this and to keep her at the 15 mL for now.  Her weight on 4/21/20:  3.88 kg gained 60 grams in 5 days; 12 grams/day.  Informed mom to increase her feedings to 67 mL today with the goal of increasing to 70 mL by tomorrow or Thursday, this will provide 115 calorie/kg.  Mom verbalized understanding.  Mom stated, she is only getting 20 mL of breast milk a day, we discussed if mom wants to continue to pump versus stopping its ok, support provided.  Informed mom she could always try putting Janett to breast using the breast shield and see how she does if mom wants.  Informed mom if she wants to continue to obtain breast milk I can have our lactation RN Adele reach out to mom.  She verbalized understanding and will let me know.  Informed mom I will call her on Friday 4/24/20 to check on Janett.       none

## 2022-11-28 NOTE — H&P PST ADULT - NSICDXPASTMEDICALHX_GEN_ALL_CORE_FT
PAST MEDICAL HISTORY:  2019 novel coronavirus disease (COVID-19) 10/2022- + home test- very mild illness    Fracture of sacrum     Left knee pain     Right knee pain

## 2022-11-28 NOTE — H&P PST ADULT - HISTORY OF PRESENT ILLNESS
61yo male patient who states that on October 10th, he was running on the track and and he had a sudden onset of right knee pain. He was using ice and Advil, but without relief. He had a Cortisone injection also without relief. He rates the pain at 1/10 at rest, but goes to 4-5/10 with movement. An MRI revealed meniscus tear and surgery was scheduled. He presents today for PSTs.

## 2022-11-28 NOTE — H&P PST ADULT - NSANTHOSAYNRD_GEN_A_CORE
No. RDU screening performed.  STOP BANG Legend: 0-2 = LOW Risk; 3-4 = INTERMEDIATE Risk; 5-8 = HIGH Risk

## 2022-11-28 NOTE — H&P PST ADULT - FALL HARM RISK - UNIVERSAL INTERVENTIONS
Bed in lowest position, wheels locked, appropriate side rails in place/Call bell, personal items and telephone in reach/Instruct patient to call for assistance before getting out of bed or chair/Non-slip footwear when patient is out of bed/[place] to call system/Physically safe environment - no spills, clutter or unnecessary equipment/Purposeful Proactive Rounding/Room/bathroom lighting operational, light cord in reach

## 2022-11-29 ENCOUNTER — TRANSCRIPTION ENCOUNTER (OUTPATIENT)
Age: 62
End: 2022-11-29

## 2022-11-29 ENCOUNTER — OUTPATIENT (OUTPATIENT)
Dept: OUTPATIENT SERVICES | Facility: HOSPITAL | Age: 62
LOS: 1 days | End: 2022-11-29
Payer: COMMERCIAL

## 2022-11-29 ENCOUNTER — APPOINTMENT (OUTPATIENT)
Dept: ORTHOPEDIC SURGERY | Facility: HOSPITAL | Age: 62
End: 2022-11-29

## 2022-11-29 VITALS
DIASTOLIC BLOOD PRESSURE: 74 MMHG | TEMPERATURE: 98 F | SYSTOLIC BLOOD PRESSURE: 124 MMHG | RESPIRATION RATE: 12 BRPM | WEIGHT: 178.57 LBS | HEART RATE: 74 BPM | HEIGHT: 75 IN | OXYGEN SATURATION: 100 %

## 2022-11-29 VITALS
SYSTOLIC BLOOD PRESSURE: 112 MMHG | RESPIRATION RATE: 16 BRPM | TEMPERATURE: 98 F | OXYGEN SATURATION: 100 % | HEART RATE: 64 BPM | DIASTOLIC BLOOD PRESSURE: 67 MMHG

## 2022-11-29 DIAGNOSIS — Z98.890 OTHER SPECIFIED POSTPROCEDURAL STATES: Chronic | ICD-10-CM

## 2022-11-29 DIAGNOSIS — S83.241D OTHER TEAR OF MEDIAL MENISCUS, CURRENT INJURY, RIGHT KNEE, SUBSEQUENT ENCOUNTER: ICD-10-CM

## 2022-11-29 PROCEDURE — 97161 PT EVAL LOW COMPLEX 20 MIN: CPT

## 2022-11-29 PROCEDURE — 29880 ARTHRS KNE SRG MNISECTMY M&L: CPT | Mod: RT

## 2022-11-29 RX ORDER — SODIUM CHLORIDE 9 MG/ML
1000 INJECTION, SOLUTION INTRAVENOUS
Refills: 0 | Status: DISCONTINUED | OUTPATIENT
Start: 2022-11-29 | End: 2022-11-29

## 2022-11-29 RX ORDER — OXYCODONE HYDROCHLORIDE 5 MG/1
5 TABLET ORAL ONCE
Refills: 0 | Status: DISCONTINUED | OUTPATIENT
Start: 2022-11-29 | End: 2022-11-29

## 2022-11-29 RX ORDER — CEFAZOLIN SODIUM 1 G
2000 VIAL (EA) INJECTION ONCE
Refills: 0 | Status: COMPLETED | OUTPATIENT
Start: 2022-11-29 | End: 2022-11-29

## 2022-11-29 RX ORDER — HYDROMORPHONE HYDROCHLORIDE 2 MG/ML
0.25 INJECTION INTRAMUSCULAR; INTRAVENOUS; SUBCUTANEOUS
Refills: 0 | Status: DISCONTINUED | OUTPATIENT
Start: 2022-11-29 | End: 2022-11-29

## 2022-11-29 RX ORDER — HYDROMORPHONE HYDROCHLORIDE 2 MG/ML
0.5 INJECTION INTRAMUSCULAR; INTRAVENOUS; SUBCUTANEOUS
Refills: 0 | Status: DISCONTINUED | OUTPATIENT
Start: 2022-11-29 | End: 2022-11-29

## 2022-11-29 RX ORDER — KETOROLAC TROMETHAMINE 0.5 %
1 DROPS OPHTHALMIC (EYE)
Qty: 0 | Refills: 0 | DISCHARGE

## 2022-11-29 RX ORDER — APREPITANT 80 MG/1
40 CAPSULE ORAL ONCE
Refills: 0 | Status: COMPLETED | OUTPATIENT
Start: 2022-11-29 | End: 2022-11-29

## 2022-11-29 RX ORDER — ONDANSETRON 8 MG/1
4 TABLET, FILM COATED ORAL ONCE
Refills: 0 | Status: DISCONTINUED | OUTPATIENT
Start: 2022-11-29 | End: 2022-11-29

## 2022-11-29 RX ORDER — ASCORBIC ACID 60 MG
1 TABLET,CHEWABLE ORAL
Qty: 0 | Refills: 0 | DISCHARGE

## 2022-11-29 RX ORDER — CHLORHEXIDINE GLUCONATE 213 G/1000ML
1 SOLUTION TOPICAL ONCE
Refills: 0 | Status: COMPLETED | OUTPATIENT
Start: 2022-11-29 | End: 2022-11-29

## 2022-11-29 RX ORDER — OMEGA-3 ACID ETHYL ESTERS 1 G
1 CAPSULE ORAL
Qty: 0 | Refills: 0 | DISCHARGE

## 2022-11-29 RX ORDER — VITAMIN E 100 UNIT
1 CAPSULE ORAL
Qty: 0 | Refills: 0 | DISCHARGE

## 2022-11-29 RX ORDER — IBUPROFEN 200 MG
1 TABLET ORAL
Qty: 0 | Refills: 0 | DISCHARGE

## 2022-11-29 RX ADMIN — OXYCODONE HYDROCHLORIDE 5 MILLIGRAM(S): 5 TABLET ORAL at 14:57

## 2022-11-29 RX ADMIN — OXYCODONE HYDROCHLORIDE 5 MILLIGRAM(S): 5 TABLET ORAL at 15:27

## 2022-11-29 RX ADMIN — SODIUM CHLORIDE 75 MILLILITER(S): 9 INJECTION, SOLUTION INTRAVENOUS at 14:49

## 2022-11-29 RX ADMIN — APREPITANT 40 MILLIGRAM(S): 80 CAPSULE ORAL at 11:16

## 2022-11-29 RX ADMIN — CHLORHEXIDINE GLUCONATE 1 APPLICATION(S): 213 SOLUTION TOPICAL at 11:16

## 2022-11-29 NOTE — ASU DISCHARGE PLAN (ADULT/PEDIATRIC) - CALL YOUR DOCTOR IF YOU HAVE ANY OF THE FOLLOWING:
Fever greater than (need to indicate Fahrenheit or Celsius)/Wound/Surgical Site with redness, or foul smelling discharge or pus/Numbness, tingling, color or temperature change to extremity/Nausea and vomiting that does not stop/Inability to tolerate liquids or foods

## 2022-11-29 NOTE — ASU DISCHARGE PLAN (ADULT/PEDIATRIC) - NS MD DC FALL RISK RISK
For information on Fall & Injury Prevention, visit: [email or web address] OR  [email or web address] OR  https://www.cdc.gov/steadi/patient.html

## 2022-11-29 NOTE — ASU DISCHARGE PLAN (ADULT/PEDIATRIC) - CARE PROVIDER_API CALL
Jordy Sanchez)  Orthopaedic Surgery  [address]  [place] [address]  Phone: [phone]  Fax: [phone]  Established Patient  Follow Up Time: 2 weeks

## 2022-11-29 NOTE — PHYSICAL THERAPY INITIAL EVALUATION ADULT - PLANNED THERAPY INTERVENTIONS, PT EVAL
pt seen in [facility] for ambulation and stair training WBAT right LE with cane. Pt is independent with all functional activites. NO needs for PT at this time. Pt given cane for home use.

## 2022-12-08 ENCOUNTER — APPOINTMENT (OUTPATIENT)
Dept: ORTHOPEDIC SURGERY | Facility: CLINIC | Age: 62
End: 2022-12-08

## 2022-12-08 DIAGNOSIS — S83.241D OTHER TEAR OF MEDIAL MENISCUS, CURRENT INJURY, RIGHT KNEE, SUBSEQUENT ENCOUNTER: ICD-10-CM

## 2022-12-08 PROBLEM — S32.10XA UNSPECIFIED FRACTURE OF SACRUM, INITIAL ENCOUNTER FOR CLOSED FRACTURE: Chronic | Status: ACTIVE | Noted: 2022-11-28

## 2022-12-08 PROBLEM — U07.1 COVID-19: Chronic | Status: ACTIVE | Noted: 2022-11-28

## 2022-12-08 PROBLEM — M25.561 PAIN IN RIGHT KNEE: Chronic | Status: ACTIVE | Noted: 2022-11-28

## 2022-12-08 PROCEDURE — 99024 POSTOP FOLLOW-UP VISIT: CPT

## 2023-01-12 ENCOUNTER — APPOINTMENT (OUTPATIENT)
Dept: ORTHOPEDIC SURGERY | Facility: CLINIC | Age: 63
End: 2023-01-12
Payer: COMMERCIAL

## 2023-01-12 PROCEDURE — 99024 POSTOP FOLLOW-UP VISIT: CPT

## 2023-02-16 ENCOUNTER — APPOINTMENT (OUTPATIENT)
Dept: ORTHOPEDIC SURGERY | Facility: CLINIC | Age: 63
End: 2023-02-16
Payer: COMMERCIAL

## 2023-02-16 DIAGNOSIS — M17.11 UNILATERAL PRIMARY OSTEOARTHRITIS, RIGHT KNEE: ICD-10-CM

## 2023-02-16 DIAGNOSIS — S76.312A STRAIN OF MUSCLE, FASCIA AND TENDON OF THE POSTERIOR MUSCLE GROUP AT THIGH LEVEL, LEFT THIGH, INITIAL ENCOUNTER: ICD-10-CM

## 2023-02-16 DIAGNOSIS — S76.311A STRAIN OF MUSCLE, FASCIA AND TENDON OF THE POSTERIOR MUSCLE GROUP AT THIGH LEVEL, RIGHT THIGH, INITIAL ENCOUNTER: ICD-10-CM

## 2023-02-16 PROCEDURE — 99213 OFFICE O/P EST LOW 20 MIN: CPT | Mod: 24

## 2023-02-16 RX ORDER — HYALURONATE SODIUM, STABILIZED 88 MG/4 ML
88 SYRINGE (ML) INTRAARTICULAR
Qty: 1 | Refills: 0 | Status: ACTIVE | COMMUNITY
Start: 2023-02-16

## 2023-02-26 ENCOUNTER — FORM ENCOUNTER (OUTPATIENT)
Age: 63
End: 2023-02-26

## 2023-03-22 ENCOUNTER — APPOINTMENT (OUTPATIENT)
Dept: ORTHOPEDIC SURGERY | Facility: CLINIC | Age: 63
End: 2023-03-22

## 2023-04-03 ENCOUNTER — APPOINTMENT (OUTPATIENT)
Dept: ORTHOPEDIC SURGERY | Facility: CLINIC | Age: 63
End: 2023-04-03
Payer: COMMERCIAL

## 2023-04-03 PROCEDURE — 20610 DRAIN/INJ JOINT/BURSA W/O US: CPT | Mod: RT

## 2023-04-10 ENCOUNTER — APPOINTMENT (OUTPATIENT)
Dept: ORTHOPEDIC SURGERY | Facility: CLINIC | Age: 63
End: 2023-04-10
Payer: COMMERCIAL

## 2023-04-10 VITALS — BODY MASS INDEX: 22.38 KG/M2 | WEIGHT: 180 LBS | HEIGHT: 75 IN

## 2023-04-10 PROCEDURE — 20610 DRAIN/INJ JOINT/BURSA W/O US: CPT | Mod: RT

## 2023-04-17 ENCOUNTER — APPOINTMENT (OUTPATIENT)
Dept: ORTHOPEDIC SURGERY | Facility: CLINIC | Age: 63
End: 2023-04-17
Payer: COMMERCIAL

## 2023-04-17 DIAGNOSIS — M76.52 PATELLAR TENDINITIS, LEFT KNEE: ICD-10-CM

## 2023-04-17 PROCEDURE — 20610 DRAIN/INJ JOINT/BURSA W/O US: CPT | Mod: RT

## 2023-04-17 PROCEDURE — 99213 OFFICE O/P EST LOW 20 MIN: CPT | Mod: 25

## 2023-04-27 ENCOUNTER — APPOINTMENT (OUTPATIENT)
Dept: OPHTHALMOLOGY | Facility: CLINIC | Age: 63
End: 2023-04-27
Payer: COMMERCIAL

## 2023-04-27 ENCOUNTER — NON-APPOINTMENT (OUTPATIENT)
Age: 63
End: 2023-04-27

## 2023-04-27 PROCEDURE — 92134 CPTRZ OPH DX IMG PST SGM RTA: CPT

## 2023-04-27 PROCEDURE — 92014 COMPRE OPH EXAM EST PT 1/>: CPT

## 2023-05-25 ENCOUNTER — APPOINTMENT (OUTPATIENT)
Dept: ORTHOPEDIC SURGERY | Facility: CLINIC | Age: 63
End: 2023-05-25
Payer: COMMERCIAL

## 2023-05-25 PROCEDURE — 99213 OFFICE O/P EST LOW 20 MIN: CPT

## 2023-06-13 ENCOUNTER — APPOINTMENT (OUTPATIENT)
Dept: ORTHOPEDIC SURGERY | Facility: CLINIC | Age: 63
End: 2023-06-13
Payer: COMMERCIAL

## 2023-06-13 VITALS — HEIGHT: 75 IN | WEIGHT: 180 LBS | BODY MASS INDEX: 22.38 KG/M2

## 2023-06-13 DIAGNOSIS — S83.241A OTHER TEAR OF MEDIAL MENISCUS, CURRENT INJURY, RIGHT KNEE, INITIAL ENCOUNTER: ICD-10-CM

## 2023-06-13 PROCEDURE — 99214 OFFICE O/P EST MOD 30 MIN: CPT

## 2023-06-15 PROBLEM — S83.241A ACUTE MEDIAL MENISCAL TEAR, RIGHT, INITIAL ENCOUNTER: Status: ACTIVE | Noted: 2023-06-15

## 2023-06-26 ENCOUNTER — APPOINTMENT (OUTPATIENT)
Dept: ORTHOPEDIC SURGERY | Facility: CLINIC | Age: 63
End: 2023-06-26

## 2023-06-26 LAB
ANION GAP SERPL CALC-SCNC: 11 MMOL/L
BUN SERPL-MCNC: 15 MG/DL
CALCIUM SERPL-MCNC: 9.6 MG/DL
CHLORIDE SERPL-SCNC: 106 MMOL/L
CO2 SERPL-SCNC: 24 MMOL/L
CREAT SERPL-MCNC: 1.01 MG/DL
EGFR: 85 ML/MIN/1.73M2
GLUCOSE SERPL-MCNC: 108 MG/DL
POTASSIUM SERPL-SCNC: 5 MMOL/L
SODIUM SERPL-SCNC: 141 MMOL/L

## 2023-06-27 LAB
APTT BLD: 27.5 SEC
ESTIMATED AVERAGE GLUCOSE: 111 MG/DL
HBA1C MFR BLD HPLC: 5.5 %
INR PPP: 0.92 RATIO
PT BLD: 10.7 SEC

## 2023-07-05 ENCOUNTER — APPOINTMENT (OUTPATIENT)
Dept: ORTHOPEDIC SURGERY | Facility: CLINIC | Age: 63
End: 2023-07-05

## 2023-07-05 ENCOUNTER — APPOINTMENT (OUTPATIENT)
Dept: ORTHOPEDIC SURGERY | Facility: AMBULATORY MEDICAL SERVICES | Age: 63
End: 2023-07-05

## 2023-07-24 ENCOUNTER — APPOINTMENT (OUTPATIENT)
Dept: ORTHOPEDIC SURGERY | Facility: CLINIC | Age: 63
End: 2023-07-24
Payer: COMMERCIAL

## 2023-07-24 PROCEDURE — 99213 OFFICE O/P EST LOW 20 MIN: CPT | Mod: 25

## 2023-07-24 PROCEDURE — 20610 DRAIN/INJ JOINT/BURSA W/O US: CPT | Mod: LT

## 2023-07-24 NOTE — REASON FOR VISIT
[Follow-Up Visit] : a follow-up visit for [Knee Pain] : knee pain [Other: _____] : [unfilled] [FreeTextEntry2] : s/p EUA, arthroscopy right knee, partial medial and lateral meniscectomy, chondroplasty patellofemoral compartment as well as medial tibial plateau, and excision of pathological plica; right knee. DOS: 11/29/2022.

## 2023-07-24 NOTE — ADDENDUM
[FreeTextEntry1] : I, DARRIUSUSMAN GOMEZ, acted solely as a scribe for Dr. Jordy Sanchez on this date 07/24/2023.\par \par All medical record entries made by the Scribe were at my, Dr. Jordy Sanchez, direction and personally dictated by me on 07/24/2023. I have reviewed the chart and agree that the record accurately reflects my personal performance of the history, physical exam, assessment and plan. I have also personally directed, reviewed, and agreed with the chart.

## 2023-07-24 NOTE — HISTORY OF PRESENT ILLNESS
[de-identified] : 63 year old male presents for a left knee 1/3 Synvisc injection. He denies any allergies to chicken eggs or feathers. He notes about a month ago he has occasional pain. He is s/p right knee arthroscopy done on 11/29/22. He finished a Synvisc injection series on 04/17/23. \par \par Radiology Results from 11/02/2022:\par o Right Knee : Standing AP, lateral, tunnel, and skyline views of the knee were obtained and reveal mild medial compartment narrowing, moderate patellofemoral arthritis, no lytic lesions or loose bodies.\par \par Radiology Results from 5/25/2022:\par o Left Knee : Standing AP, lateral, tunnel, and skyline views of the knee were obtained and reveal moderate patellofemoral arthritis.

## 2023-07-24 NOTE — DISCUSSION/SUMMARY
[de-identified] : I went over the pathophysiology of the patient's symptoms in great detail with the patient. The patient elected to receive a Synvisc injection into his left knee today, and tolerated it well. I instructed the patient on ROM exercises, and told them to take it easy. The use of ice and rest was reviewed with the patient. The patient may resume activities tomorrow. I reminded the patient that it takes 4 to 6 weeks after the final injection to feel symptom relief. \par \par All of their questions were answered. They understand and consent to the plan.\par \par FU next week after a left knee 1/3 Synvisc injection today 07/24/2023.

## 2023-07-31 ENCOUNTER — APPOINTMENT (OUTPATIENT)
Dept: ORTHOPEDIC SURGERY | Facility: CLINIC | Age: 63
End: 2023-07-31
Payer: COMMERCIAL

## 2023-07-31 DIAGNOSIS — M17.12 UNILATERAL PRIMARY OSTEOARTHRITIS, LEFT KNEE: ICD-10-CM

## 2023-07-31 PROCEDURE — 99213 OFFICE O/P EST LOW 20 MIN: CPT | Mod: 25

## 2023-07-31 PROCEDURE — 20610 DRAIN/INJ JOINT/BURSA W/O US: CPT | Mod: LT

## 2023-07-31 NOTE — PHYSICAL EXAM
[de-identified] : Constitutional o Appearance : well-nourished, well developed, alert, in no acute distress  Head and Face o Head :  Inspection : atraumatic, normocephalic o Face :  Inspection : no visible rash or discoloration Respiratory o Respiratory Effort: breathing unlabored  Neurologic o Mental Status Examination :  Orientation : alert and oriented X 3 Psychiatric o Mood and Affect: mood normal, affect appropriate Cardiovascular o Observation/Palpation : - no swelling Lymphatic o Additional Nodes : No palpable lymph nodes present  Right Lower Extremity o Buttock : no tenderness, swelling or deformities  o Right Hip :  Inspection/Palpation : no tenderness, swelling or deformities  Range of Motion : full and painless in all planes, no crepitance  Stability : joint stability intact  Strength : extension, flexion 5/5, adduction, abduction, internal rotation and external rotation  o Right Knee :  Inspection/Palpation : no medial / lateral compartment tenderness, no swelling, well-healed arthroscopic portals  Range of Motion : 0-130, pain with full flexion, no crepitance, slightly decreased patellofemoral glide   Stability : no valgus or varus instability present on provocative testing  Strength : flexion and extension 5/5  Tests and Signs : negative Anterior Drawer, negative Lachman  Left Lower Extremity o Buttock : no tenderness, swelling or deformities  o Left Hip :  Inspection/Palpation : no tenderness, no swelling or deformities  Range of Motion : full and painless in all planes, no crepitance  Stability : joint stability intact  Strength : extension, flexion, adduction, abduction, internal rotation and external rotation, 5/5  o Left Knee :  Inspection/Palpation : minimal medial compartment tenderness , inferior patellar tenderness, well-healed arthroscopic portals, no swelling  Range of Motion : 0-130, no crepitance, good patellofemoral glide   Stability : no valgus or varus instability present on provocative testing  Strength : flexion 5/5, extension 5/5  Tests and Signs : negative Anterior Drawer, negative Lachman  Gait and Station: Gait: no abductor lurch on the right, no significant extremity swelling or lymphedema, good proprioception and balance, mild hamstrings tightness R>L  o Knee injection : Indication- left knee osteoarthritis, Anatomic location- left intra-articular joint space, Spray - area was sterilized with Betadine and alcohol and anesthetized with Ethyl Chloride , needle used-20G, Medications given- 6cc's Synvisc (2/3) The patient tolerated the procedure well. Lot# [id number] Exp#2025-03

## 2023-07-31 NOTE — DISCUSSION/SUMMARY
[de-identified] : I went over the pathophysiology of the patient's symptoms in great detail with the patient. The patient elected to receive a Synvisc injection into his left knee today and tolerated it well. I instructed the patient on ROM exercises and told them to take it easy. The use of ice and rest was reviewed with the patient. The patient may resume activities tomorrow. I reminded the patient that it takes 4 to 6 weeks after the final injection to feel symptom relief.   All of their questions were answered. They understand and consent to the plan.  FU next week after a left knee 2/3 Synvisc injection today 07/31/2023.

## 2023-08-02 ENCOUNTER — APPOINTMENT (OUTPATIENT)
Dept: ORTHOPEDIC SURGERY | Facility: CLINIC | Age: 63
End: 2023-08-02

## 2023-08-09 ENCOUNTER — APPOINTMENT (OUTPATIENT)
Dept: ORTHOPEDIC SURGERY | Facility: CLINIC | Age: 63
End: 2023-08-09
Payer: COMMERCIAL

## 2023-08-09 PROCEDURE — 20610 DRAIN/INJ JOINT/BURSA W/O US: CPT | Mod: LT

## 2023-08-09 NOTE — HISTORY OF PRESENT ILLNESS
[de-identified] : 63-year-old male presents for a left knee 3/3 Synvisc injection today 08/09/2023. He denies any allergies to chicken eggs or feathers. He notes occasional pain underneath the left knee cap. He is s/p right knee arthroscopy done on 11/29/22.   Radiology Results from 11/02/2022: o Right Knee : Standing AP, lateral, tunnel, and skyline views of the knee were obtained and reveal mild medial compartment narrowing, moderate patellofemoral arthritis, no lytic lesions or loose bodies.  Radiology Results from 5/25/2022: o Left Knee : Standing AP, lateral, tunnel, and skyline views of the knee were obtained and reveal moderate patellofemoral arthritis.

## 2023-08-09 NOTE — PHYSICAL EXAM
[de-identified] : Constitutional o Appearance : well-nourished, well developed, alert, in no acute distress  Head and Face o Head :  Inspection : atraumatic, normocephalic o Face :  Inspection : no visible rash or discoloration Respiratory o Respiratory Effort: breathing unlabored  Neurologic o Mental Status Examination :  Orientation : alert and oriented X 3 Psychiatric o Mood and Affect: mood normal, affect appropriate Cardiovascular o Observation/Palpation : - no swelling Lymphatic o Additional Nodes : No palpable lymph nodes present  Right Lower Extremity o Buttock : no tenderness, swelling or deformities  o Right Hip :  Inspection/Palpation : no tenderness, swelling or deformities  Range of Motion : full and painless in all planes, no crepitance  Stability : joint stability intact  Strength : extension, flexion 5/5, adduction, abduction, internal rotation and external rotation  o Right Knee :  Inspection/Palpation : no medial / lateral compartment tenderness, no swelling, well-healed arthroscopic portals  Range of Motion : 0-130, pain with full flexion, no crepitance, slightly decreased patellofemoral glide   Stability : no valgus or varus instability present on provocative testing  Strength : flexion and extension 5/5  Tests and Signs : negative Anterior Drawer, negative Lachman  Left Lower Extremity o Buttock : no tenderness, swelling or deformities  o Left Hip :  Inspection/Palpation : no tenderness, no swelling or deformities  Range of Motion : full and painless in all planes, no crepitance  Stability : joint stability intact  Strength : extension, flexion, adduction, abduction, internal rotation and external rotation, 5/5  o Left Knee :  Inspection/Palpation : minimal medial compartment tenderness, tenderness over the medial femoral condyle, inferior patellar tenderness, well-healed arthroscopic portals, no swelling  Range of Motion : 0-130, no crepitance, good patellofemoral glide   Stability : no valgus or varus instability present on provocative testing  Strength : flexion 5/5, extension 5/5  Tests and Signs : negative Anterior Drawer, negative Lachman  Gait and Station: Gait: no abductor lurch on the right, no significant extremity swelling or lymphedema, good proprioception and balance, mild hamstrings tightness R>L  o Knee injection : Indication- left knee osteoarthritis, Anatomic location- left intra-articular joint space, Spray - area was sterilized with Betadine and alcohol and anesthetized with Ethyl Chloride , needle used-20G, Medications given- 6cc's Synvisc (3/3) The patient tolerated the procedure well. Lot# [id number] Exp#2025-03

## 2023-09-13 ENCOUNTER — APPOINTMENT (OUTPATIENT)
Dept: ORTHOPEDIC SURGERY | Facility: CLINIC | Age: 63
End: 2023-09-13
Payer: COMMERCIAL

## 2023-09-13 DIAGNOSIS — M17.0 BILATERAL PRIMARY OSTEOARTHRITIS OF KNEE: ICD-10-CM

## 2023-09-13 PROCEDURE — 99214 OFFICE O/P EST MOD 30 MIN: CPT

## 2023-09-13 PROCEDURE — 73564 X-RAY EXAM KNEE 4 OR MORE: CPT | Mod: 50

## 2023-10-05 ENCOUNTER — NON-APPOINTMENT (OUTPATIENT)
Age: 63
End: 2023-10-05

## 2023-10-05 ENCOUNTER — APPOINTMENT (OUTPATIENT)
Dept: OPHTHALMOLOGY | Facility: CLINIC | Age: 63
End: 2023-10-05
Payer: COMMERCIAL

## 2023-10-05 PROCEDURE — 92014 COMPRE OPH EXAM EST PT 1/>: CPT

## 2023-10-05 PROCEDURE — 92012 INTRM OPH EXAM EST PATIENT: CPT

## 2023-10-05 PROCEDURE — 92134 CPTRZ OPH DX IMG PST SGM RTA: CPT

## 2023-12-13 ENCOUNTER — APPOINTMENT (OUTPATIENT)
Dept: ORTHOPEDIC SURGERY | Facility: CLINIC | Age: 63
End: 2023-12-13

## 2024-03-28 ENCOUNTER — NON-APPOINTMENT (OUTPATIENT)
Age: 64
End: 2024-03-28

## 2024-03-28 ENCOUNTER — APPOINTMENT (OUTPATIENT)
Dept: OPHTHALMOLOGY | Facility: CLINIC | Age: 64
End: 2024-03-28
Payer: COMMERCIAL

## 2024-03-28 PROCEDURE — 92134 CPTRZ OPH DX IMG PST SGM RTA: CPT

## 2024-03-28 PROCEDURE — 92014 COMPRE OPH EXAM EST PT 1/>: CPT

## 2024-07-05 ENCOUNTER — APPOINTMENT (OUTPATIENT)
Dept: OPHTHALMOLOGY | Facility: CLINIC | Age: 64
End: 2024-07-05
Payer: COMMERCIAL

## 2024-07-05 ENCOUNTER — NON-APPOINTMENT (OUTPATIENT)
Age: 64
End: 2024-07-05

## 2024-07-05 PROCEDURE — 92012 INTRM OPH EXAM EST PATIENT: CPT

## 2024-07-12 ENCOUNTER — APPOINTMENT (OUTPATIENT)
Dept: OPHTHALMOLOGY | Facility: CLINIC | Age: 64
End: 2024-07-12
Payer: COMMERCIAL

## 2024-07-12 ENCOUNTER — NON-APPOINTMENT (OUTPATIENT)
Age: 64
End: 2024-07-12

## 2024-07-12 PROCEDURE — 99213 OFFICE O/P EST LOW 20 MIN: CPT

## 2024-07-18 ENCOUNTER — NON-APPOINTMENT (OUTPATIENT)
Age: 64
End: 2024-07-18

## 2024-07-18 ENCOUNTER — APPOINTMENT (OUTPATIENT)
Dept: OPHTHALMOLOGY | Facility: CLINIC | Age: 64
End: 2024-07-18
Payer: COMMERCIAL

## 2024-07-18 PROCEDURE — 99213 OFFICE O/P EST LOW 20 MIN: CPT

## 2024-08-01 ENCOUNTER — APPOINTMENT (OUTPATIENT)
Dept: OPHTHALMOLOGY | Facility: CLINIC | Age: 64
End: 2024-08-01
Payer: COMMERCIAL

## 2024-08-01 ENCOUNTER — NON-APPOINTMENT (OUTPATIENT)
Age: 64
End: 2024-08-01

## 2024-08-01 PROCEDURE — 99213 OFFICE O/P EST LOW 20 MIN: CPT

## 2024-08-01 PROCEDURE — 92134 CPTRZ OPH DX IMG PST SGM RTA: CPT

## 2024-09-26 ENCOUNTER — APPOINTMENT (OUTPATIENT)
Dept: OPHTHALMOLOGY | Facility: CLINIC | Age: 64
End: 2024-09-26

## 2024-12-25 PROBLEM — F10.90 ALCOHOL USE: Status: ACTIVE | Noted: 2017-05-12

## 2025-01-30 ENCOUNTER — APPOINTMENT (OUTPATIENT)
Dept: CT IMAGING | Facility: CLINIC | Age: 65
End: 2025-01-30
Payer: COMMERCIAL

## 2025-01-30 ENCOUNTER — RESULT REVIEW (OUTPATIENT)
Age: 65
End: 2025-01-30

## 2025-01-30 ENCOUNTER — OUTPATIENT (OUTPATIENT)
Dept: OUTPATIENT SERVICES | Facility: HOSPITAL | Age: 65
LOS: 1 days | End: 2025-01-30
Payer: COMMERCIAL

## 2025-01-30 DIAGNOSIS — Z98.890 OTHER SPECIFIED POSTPROCEDURAL STATES: Chronic | ICD-10-CM

## 2025-01-30 DIAGNOSIS — Z00.8 ENCOUNTER FOR OTHER GENERAL EXAMINATION: ICD-10-CM

## 2025-01-30 PROCEDURE — 75574 CT ANGIO HRT W/3D IMAGE: CPT

## 2025-01-30 PROCEDURE — 75574 CT ANGIO HRT W/3D IMAGE: CPT | Mod: 26

## 2025-03-12 DIAGNOSIS — R93.1 ABNORMAL FINDINGS ON DIAGNOSTIC IMAGING OF HEART AND CORONARY CIRCULATION: ICD-10-CM

## 2025-03-12 DIAGNOSIS — E78.2 MIXED HYPERLIPIDEMIA: ICD-10-CM

## 2025-03-12 RX ORDER — ROSUVASTATIN CALCIUM 5 MG/1
5 TABLET, FILM COATED ORAL
Qty: 90 | Refills: 1 | Status: ACTIVE | COMMUNITY
Start: 2025-03-12 | End: 1900-01-01

## 2025-03-16 LAB
ALBUMIN SERPL ELPH-MCNC: 4.6 G/DL
ALP BLD-CCNC: 57 U/L
ALT SERPL-CCNC: 19 U/L
ANION GAP SERPL CALC-SCNC: 11 MMOL/L
AST SERPL-CCNC: 21 U/L
BILIRUB SERPL-MCNC: 0.5 MG/DL
BUN SERPL-MCNC: 21 MG/DL
CALCIUM SERPL-MCNC: 9.8 MG/DL
CHLORIDE SERPL-SCNC: 104 MMOL/L
CHOLEST SERPL-MCNC: 197 MG/DL
CO2 SERPL-SCNC: 27 MMOL/L
CREAT SERPL-MCNC: 1.18 MG/DL
EGFRCR SERPLBLD CKD-EPI 2021: 70 ML/MIN/1.73M2
GLUCOSE SERPL-MCNC: 91 MG/DL
HDLC SERPL-MCNC: 69 MG/DL
LDLC SERPL CALC-MCNC: 120 MG/DL
NONHDLC SERPL-MCNC: 128 MG/DL
POTASSIUM SERPL-SCNC: 4.2 MMOL/L
PROT SERPL-MCNC: 6.9 G/DL
SODIUM SERPL-SCNC: 142 MMOL/L
TRIGL SERPL-MCNC: 45 MG/DL

## 2025-06-17 ENCOUNTER — APPOINTMENT (OUTPATIENT)
Dept: CARDIOLOGY | Facility: CLINIC | Age: 65
End: 2025-06-17
Payer: COMMERCIAL

## 2025-06-17 VITALS
HEART RATE: 99 BPM | OXYGEN SATURATION: 98 % | WEIGHT: 173.19 LBS | BODY MASS INDEX: 21.53 KG/M2 | DIASTOLIC BLOOD PRESSURE: 62 MMHG | SYSTOLIC BLOOD PRESSURE: 118 MMHG | HEIGHT: 75 IN

## 2025-06-17 PROCEDURE — G2211 COMPLEX E/M VISIT ADD ON: CPT | Mod: NC

## 2025-06-17 PROCEDURE — 93000 ELECTROCARDIOGRAM COMPLETE: CPT

## 2025-06-17 PROCEDURE — 99204 OFFICE O/P NEW MOD 45 MIN: CPT

## 2025-06-17 RX ORDER — ASPIRIN 81 MG/1
81 TABLET, DELAYED RELEASE ORAL
Qty: 90 | Refills: 3 | Status: ACTIVE | COMMUNITY
Start: 2025-06-17

## 2025-07-17 ENCOUNTER — APPOINTMENT (OUTPATIENT)
Dept: OPHTHALMOLOGY | Facility: CLINIC | Age: 65
End: 2025-07-17

## (undated) DEVICE — TUBING SUCTION 20FT

## (undated) DEVICE — WAND COBLATION WEREWOLF FLOW 50

## (undated) DEVICE — DRAPE 3/4 SHEET 52X76"

## (undated) DEVICE — CANISTER SUCTION LID GUARD 3000CC

## (undated) DEVICE — POSITIONER FOAM HEAD CRADLE

## (undated) DEVICE — PACK KNEE ARTHROSCOPY

## (undated) DEVICE — CANISTER SUCTION LINER 1500 CC

## (undated) DEVICE — SPONGE LAP X-RAY DETECTABLE 4X18"

## (undated) DEVICE — BLANKET WARMER UPPER ADULT

## (undated) DEVICE — SHAVER BLADE ULTRAGATOR 3.5MM

## (undated) DEVICE — POSITIONER PATIENT SAFETY STRAP 3X60"

## (undated) DEVICE — WRAP COMPRESSION CALF MED

## (undated) DEVICE — NDL SPINAL 18G X 3.5"

## (undated) DEVICE — BOOT COVER NONSKID IMPERVIOUS

## (undated) DEVICE — GLV 7.5 ESTEEM BLUE

## (undated) DEVICE — TUBING SET GRAVITY 4 SPIKE

## (undated) DEVICE — CUFF TOURNIQUET 34" DUAL PORT W PLC

## (undated) DEVICE — SOLIDIFIER CANN EXPRESS 3K

## (undated) DEVICE — SOL IRR BAG NS 0.9% 3000ML

## (undated) DEVICE — SUT MONOSOF 4-0 18" C-13

## (undated) DEVICE — DVC SUCTION FLR PUDDLE GUPPY

## (undated) DEVICE — WAND COBLATION WEREWOLF FLOW 90

## (undated) DEVICE — GLV 7.5 PROTEXIS NEUTHERA